# Patient Record
Sex: FEMALE | Race: WHITE | Employment: OTHER | URBAN - METROPOLITAN AREA
[De-identification: names, ages, dates, MRNs, and addresses within clinical notes are randomized per-mention and may not be internally consistent; named-entity substitution may affect disease eponyms.]

---

## 2017-01-30 PROCEDURE — 81003 URINALYSIS AUTO W/O SCOPE: CPT | Performed by: INTERNAL MEDICINE

## 2017-02-01 PROBLEM — D50.8 IRON DEFICIENCY ANEMIA FOLLOWING BARIATRIC SURGERY: Status: ACTIVE | Noted: 2017-02-01

## 2017-02-01 PROBLEM — K95.89 IRON DEFICIENCY ANEMIA FOLLOWING BARIATRIC SURGERY: Status: ACTIVE | Noted: 2017-02-01

## 2017-02-08 PROCEDURE — 82607 VITAMIN B-12: CPT | Performed by: PHYSICIAN ASSISTANT

## 2017-02-08 PROCEDURE — 82746 ASSAY OF FOLIC ACID SERUM: CPT | Performed by: PHYSICIAN ASSISTANT

## 2017-02-24 ENCOUNTER — OFFICE VISIT (OUTPATIENT)
Dept: HEMATOLOGY/ONCOLOGY | Facility: HOSPITAL | Age: 37
End: 2017-02-24
Attending: INTERNAL MEDICINE
Payer: MEDICAID

## 2017-02-24 VITALS
RESPIRATION RATE: 20 BRPM | SYSTOLIC BLOOD PRESSURE: 128 MMHG | DIASTOLIC BLOOD PRESSURE: 81 MMHG | HEART RATE: 79 BPM | TEMPERATURE: 96 F

## 2017-02-24 DIAGNOSIS — D50.8 IRON DEFICIENCY ANEMIA FOLLOWING BARIATRIC SURGERY: Primary | ICD-10-CM

## 2017-02-24 DIAGNOSIS — K95.89 IRON DEFICIENCY ANEMIA FOLLOWING BARIATRIC SURGERY: Primary | ICD-10-CM

## 2017-02-24 PROCEDURE — 96374 THER/PROPH/DIAG INJ IV PUSH: CPT

## 2017-02-24 NOTE — PROGRESS NOTES
Education Record    Learner:  Patient    Disease / Marigene Locks    Barriers / Limitations:  None   Comments:    Method:  Discussion   Comments:    General Topics:  Plan of care reviewed   Comments:    Outcome:  Shows understanding   Comments:

## 2017-03-03 ENCOUNTER — OFFICE VISIT (OUTPATIENT)
Dept: HEMATOLOGY/ONCOLOGY | Facility: HOSPITAL | Age: 37
End: 2017-03-03
Attending: INTERNAL MEDICINE
Payer: MEDICAID

## 2017-03-03 VITALS
HEIGHT: 64.02 IN | WEIGHT: 255.38 LBS | BODY MASS INDEX: 43.6 KG/M2 | SYSTOLIC BLOOD PRESSURE: 116 MMHG | OXYGEN SATURATION: 99 % | HEART RATE: 73 BPM | TEMPERATURE: 99 F | DIASTOLIC BLOOD PRESSURE: 71 MMHG | RESPIRATION RATE: 18 BRPM

## 2017-03-03 DIAGNOSIS — D50.8 IRON DEFICIENCY ANEMIA FOLLOWING BARIATRIC SURGERY: Primary | ICD-10-CM

## 2017-03-03 DIAGNOSIS — K95.89 IRON DEFICIENCY ANEMIA FOLLOWING BARIATRIC SURGERY: Primary | ICD-10-CM

## 2017-03-03 PROCEDURE — 96374 THER/PROPH/DIAG INJ IV PUSH: CPT

## 2017-03-03 RX ORDER — MULTIVITAMIN
1 TABLET ORAL DAILY
COMMUNITY
End: 2019-02-06

## 2017-03-03 NOTE — PROGRESS NOTES
Education Record    Learner:  Patient    Disease / Diagnosis:IRON DEFICIENCY ANEMIA    Barriers / Limitations:  None   Comments:    Method:  Brief focused and Reinforcement   Comments:    General Topics:  Medication, Side effects and symptom management and

## 2017-05-30 PROBLEM — K43.2 INCISIONAL HERNIA, WITHOUT OBSTRUCTION OR GANGRENE: Status: ACTIVE | Noted: 2017-05-30

## 2017-05-30 PROBLEM — D25.1 INTRAMURAL LEIOMYOMA OF UTERUS: Status: ACTIVE | Noted: 2017-05-30

## 2017-05-30 PROCEDURE — 87624 HPV HI-RISK TYP POOLED RSLT: CPT | Performed by: OBSTETRICS & GYNECOLOGY

## 2017-05-30 PROCEDURE — 88175 CYTOPATH C/V AUTO FLUID REDO: CPT | Performed by: OBSTETRICS & GYNECOLOGY

## 2017-07-28 ENCOUNTER — OFFICE VISIT (OUTPATIENT)
Dept: HEMATOLOGY/ONCOLOGY | Age: 37
End: 2017-07-28
Attending: INTERNAL MEDICINE
Payer: MEDICAID

## 2017-07-28 VITALS
DIASTOLIC BLOOD PRESSURE: 77 MMHG | HEART RATE: 65 BPM | TEMPERATURE: 99 F | SYSTOLIC BLOOD PRESSURE: 121 MMHG | RESPIRATION RATE: 18 BRPM

## 2017-07-28 DIAGNOSIS — D50.8 IRON DEFICIENCY ANEMIA FOLLOWING BARIATRIC SURGERY: Primary | ICD-10-CM

## 2017-07-28 DIAGNOSIS — K95.89 IRON DEFICIENCY ANEMIA FOLLOWING BARIATRIC SURGERY: Primary | ICD-10-CM

## 2017-07-28 PROCEDURE — 96374 THER/PROPH/DIAG INJ IV PUSH: CPT

## 2017-07-28 NOTE — PROGRESS NOTES
Education Record  Learner:  Patient  Disease / Diagnosis:   Iron-deficiency anemia  Barriers / Limitations:  None  Method:  Printed material and Reinforcement  General Topics:  Plan of care reviewed  Outcome:  Shows understanding and Patient given copies o

## 2018-01-07 ENCOUNTER — HOSPITAL ENCOUNTER (EMERGENCY)
Age: 38
Discharge: HOME | End: 2018-01-07
Attending: EMERGENCY MEDICINE
Payer: MEDICAID

## 2018-01-07 VITALS
TEMPERATURE: 98 F | SYSTOLIC BLOOD PRESSURE: 148 MMHG | WEIGHT: 236 LBS | DIASTOLIC BLOOD PRESSURE: 86 MMHG | HEART RATE: 85 BPM | HEIGHT: 63 IN | OXYGEN SATURATION: 98 % | RESPIRATION RATE: 16 BRPM | BODY MASS INDEX: 41.82 KG/M2

## 2018-01-07 DIAGNOSIS — S61.411A LACERATION OF RIGHT HAND WITHOUT FOREIGN BODY, INITIAL ENCOUNTER: Primary | ICD-10-CM

## 2018-01-07 PROCEDURE — 12001 RPR S/N/AX/GEN/TRNK 2.5CM/<: CPT

## 2018-01-07 PROCEDURE — 99283 EMERGENCY DEPT VISIT LOW MDM: CPT

## 2018-01-07 NOTE — ED PROVIDER NOTES
Patient Seen in: [facility] Emergency Department In [place]    History   Patient presents with:  Laceration Abrasion (integumentary)    Stated Complaint: hand laceration    HPI    Ambidextrous 26-year-old presents for evaluation of her right hand lacerati HPI.  Constitutional and vital signs reviewed. All other systems reviewed and negative except as noted above.     Physical Exam   ED Triage Vitals [01/07/18 1612]  BP: 153/96  Pulse: 89  Resp: 18  Temp: 97.9 °F (36.6 °C)  Temp src: Temporal  SpO2: 97 % needed        Medications Prescribed:  Current Discharge Medication List

## 2018-01-15 ENCOUNTER — HOSPITAL ENCOUNTER (EMERGENCY)
Age: 38
Discharge: HOME | End: 2018-01-15
Attending: EMERGENCY MEDICINE
Payer: MEDICAID

## 2018-01-15 VITALS
WEIGHT: 238 LBS | DIASTOLIC BLOOD PRESSURE: 80 MMHG | RESPIRATION RATE: 16 BRPM | HEART RATE: 67 BPM | OXYGEN SATURATION: 100 % | HEIGHT: 64 IN | TEMPERATURE: 98 F | BODY MASS INDEX: 40.63 KG/M2 | SYSTOLIC BLOOD PRESSURE: 141 MMHG

## 2018-01-15 DIAGNOSIS — Z48.02 ENCOUNTER FOR REMOVAL OF SUTURES: Primary | ICD-10-CM

## 2018-01-15 NOTE — ED PROVIDER NOTES
I reviewed that chart and discussed the case. I have examined the patient and noted incision clean dry and intact with no surrounding erythema.       I agree with the following clinical impression(s):  Encounter for removal of sutures  (primary encounter d

## 2018-01-15 NOTE — ED PROVIDER NOTES
SUTURE REMOVAL PROCEDURE:  PROCEDURE: Removal of previously placed sutures  LOCATION OF SUTURES: right palmar medial hand   SUTURES PREVIOUSLY PLACED AT: [facility] ER      The wound demonstrates no evidence of infection with adequate tensile strength at the

## 2018-02-02 PROBLEM — N92.6 IRREGULAR PERIODS: Status: ACTIVE | Noted: 2018-02-02

## 2018-02-02 PROCEDURE — 82607 VITAMIN B-12: CPT | Performed by: INTERNAL MEDICINE

## 2018-02-02 PROCEDURE — 82746 ASSAY OF FOLIC ACID SERUM: CPT | Performed by: INTERNAL MEDICINE

## 2018-02-02 PROCEDURE — 81003 URINALYSIS AUTO W/O SCOPE: CPT | Performed by: INTERNAL MEDICINE

## 2018-02-22 ENCOUNTER — TELEPHONE (OUTPATIENT)
Dept: HEMATOLOGY/ONCOLOGY | Facility: HOSPITAL | Age: 38
End: 2018-02-22

## 2018-02-22 PROBLEM — K90.9: Status: ACTIVE | Noted: 2018-02-22

## 2018-02-22 PROBLEM — K90.9 IDIOPATHIC STEATORRHEA: Status: ACTIVE | Noted: 2018-02-22

## 2018-02-28 ENCOUNTER — OFFICE VISIT (OUTPATIENT)
Dept: HEMATOLOGY/ONCOLOGY | Age: 38
End: 2018-02-28
Attending: INTERNAL MEDICINE
Payer: MEDICAID

## 2018-02-28 VITALS
SYSTOLIC BLOOD PRESSURE: 116 MMHG | HEART RATE: 71 BPM | OXYGEN SATURATION: 99 % | TEMPERATURE: 99 F | DIASTOLIC BLOOD PRESSURE: 77 MMHG | RESPIRATION RATE: 16 BRPM

## 2018-02-28 DIAGNOSIS — K95.89 IRON DEFICIENCY ANEMIA FOLLOWING BARIATRIC SURGERY: ICD-10-CM

## 2018-02-28 DIAGNOSIS — K90.9 IDIOPATHIC STEATORRHEA: Primary | ICD-10-CM

## 2018-02-28 DIAGNOSIS — D50.8 IRON DEFICIENCY ANEMIA FOLLOWING BARIATRIC SURGERY: ICD-10-CM

## 2018-02-28 PROCEDURE — 96374 THER/PROPH/DIAG INJ IV PUSH: CPT

## 2018-02-28 RX ORDER — DIPHENHYDRAMINE HYDROCHLORIDE 50 MG/ML
25 INJECTION INTRAMUSCULAR; INTRAVENOUS ONCE AS NEEDED
Status: CANCELLED | OUTPATIENT
Start: 2018-02-28 | End: 2018-02-28

## 2018-02-28 NOTE — PROGRESS NOTES
Education Record    Learner:  Patient    Disease / Peck Gavel deficiency anemia    Barriers / Limitations:  None   Comments:    Method:  Brief focused and Printed material   Comments:    General Topics:  Medication, Side effects and symptom management

## 2018-03-07 ENCOUNTER — APPOINTMENT (OUTPATIENT)
Dept: HEMATOLOGY/ONCOLOGY | Age: 38
End: 2018-03-07
Attending: INTERNAL MEDICINE
Payer: MEDICAID

## 2018-03-14 ENCOUNTER — OFFICE VISIT (OUTPATIENT)
Dept: HEMATOLOGY/ONCOLOGY | Age: 38
End: 2018-03-14
Attending: INTERNAL MEDICINE
Payer: MEDICAID

## 2018-03-14 VITALS
SYSTOLIC BLOOD PRESSURE: 100 MMHG | HEART RATE: 65 BPM | TEMPERATURE: 99 F | DIASTOLIC BLOOD PRESSURE: 57 MMHG | RESPIRATION RATE: 18 BRPM

## 2018-03-14 DIAGNOSIS — K95.89 IRON DEFICIENCY ANEMIA FOLLOWING BARIATRIC SURGERY: ICD-10-CM

## 2018-03-14 DIAGNOSIS — K90.9 IDIOPATHIC STEATORRHEA: Primary | ICD-10-CM

## 2018-03-14 DIAGNOSIS — D50.8 IRON DEFICIENCY ANEMIA FOLLOWING BARIATRIC SURGERY: ICD-10-CM

## 2018-03-14 PROCEDURE — 96374 THER/PROPH/DIAG INJ IV PUSH: CPT

## 2018-03-14 RX ORDER — DIPHENHYDRAMINE HYDROCHLORIDE 50 MG/ML
25 INJECTION INTRAMUSCULAR; INTRAVENOUS ONCE AS NEEDED
Status: CANCELLED | OUTPATIENT
Start: 2018-03-14 | End: 2018-03-14

## 2018-07-05 ENCOUNTER — HOSPITAL ENCOUNTER (EMERGENCY)
Age: 38
Discharge: HOME | End: 2018-07-05
Attending: EMERGENCY MEDICINE
Payer: MEDICAID

## 2018-07-05 ENCOUNTER — APPOINTMENT (OUTPATIENT)
Dept: GENERAL RADIOLOGY | Age: 38
End: 2018-07-05
Attending: EMERGENCY MEDICINE
Payer: MEDICAID

## 2018-07-05 VITALS
OXYGEN SATURATION: 98 % | RESPIRATION RATE: 16 BRPM | TEMPERATURE: 98 F | WEIGHT: 240 LBS | HEIGHT: 64 IN | SYSTOLIC BLOOD PRESSURE: 143 MMHG | BODY MASS INDEX: 40.97 KG/M2 | DIASTOLIC BLOOD PRESSURE: 84 MMHG | HEART RATE: 78 BPM

## 2018-07-05 DIAGNOSIS — R07.89 CHEST WALL PAIN: Primary | ICD-10-CM

## 2018-07-05 PROCEDURE — 99283 EMERGENCY DEPT VISIT LOW MDM: CPT

## 2018-07-05 PROCEDURE — 71101 X-RAY EXAM UNILAT RIBS/CHEST: CPT | Performed by: EMERGENCY MEDICINE

## 2018-07-06 NOTE — ED INITIAL ASSESSMENT (HPI)
Patient presents with left rib pain, patient states 3 days ago she was getting her back cracked and felt pain and heard a \"snap\".

## 2018-07-06 NOTE — ED PROVIDER NOTES
Patient Seen in: [facility] Emergency Department In [place]    History   Patient presents with:  Abdomen/Flank Pain (GI/)    Stated Complaint: L rib pain after having back cracked 4 days ago    MAKENNA    Foreign Grider is a 42-year-old female coming with complaints Types: Cigarettes  Smokeless tobacco: Never Used                      Alcohol use: Yes           0.0 oz/week     Comment: special occasions only      Review of Systems    Positive for stated complaint: L rib pain after having back cracked 4 days ago  Other

## 2018-08-07 PROCEDURE — 88305 TISSUE EXAM BY PATHOLOGIST: CPT | Performed by: OBSTETRICS & GYNECOLOGY

## 2018-12-07 ENCOUNTER — HOSPITAL ENCOUNTER (EMERGENCY)
Age: 38
Discharge: HOME | End: 2018-12-07
Attending: EMERGENCY MEDICINE
Payer: MEDICAID

## 2018-12-07 VITALS
DIASTOLIC BLOOD PRESSURE: 75 MMHG | WEIGHT: 230 LBS | SYSTOLIC BLOOD PRESSURE: 117 MMHG | RESPIRATION RATE: 18 BRPM | BODY MASS INDEX: 39 KG/M2 | OXYGEN SATURATION: 97 % | HEART RATE: 72 BPM | TEMPERATURE: 98 F

## 2018-12-07 DIAGNOSIS — L03.114 CELLULITIS OF LEFT UPPER EXTREMITY: Primary | ICD-10-CM

## 2018-12-07 PROCEDURE — 99283 EMERGENCY DEPT VISIT LOW MDM: CPT

## 2018-12-07 RX ORDER — CEPHALEXIN 500 MG/1
500 CAPSULE ORAL 4 TIMES DAILY
Qty: 40 CAPSULE | Refills: 0 | Status: SHIPPED | OUTPATIENT
Start: 2018-12-07 | End: 2018-12-17

## 2018-12-08 ENCOUNTER — HOSPITAL ENCOUNTER (EMERGENCY)
Age: 38
Discharge: HOME | End: 2018-12-08
Attending: EMERGENCY MEDICINE
Payer: MEDICAID

## 2018-12-08 VITALS
WEIGHT: 229.94 LBS | TEMPERATURE: 98 F | BODY MASS INDEX: 39 KG/M2 | RESPIRATION RATE: 12 BRPM | SYSTOLIC BLOOD PRESSURE: 132 MMHG | OXYGEN SATURATION: 100 % | HEART RATE: 77 BPM | DIASTOLIC BLOOD PRESSURE: 78 MMHG

## 2018-12-08 DIAGNOSIS — L03.012 PARONYCHIA OF FINGER, LEFT: Primary | ICD-10-CM

## 2018-12-08 PROCEDURE — 99283 EMERGENCY DEPT VISIT LOW MDM: CPT

## 2018-12-08 PROCEDURE — 10060 I&D ABSCESS SIMPLE/SINGLE: CPT

## 2018-12-08 PROCEDURE — 99282 EMERGENCY DEPT VISIT SF MDM: CPT

## 2018-12-08 RX ORDER — CLINDAMYCIN HYDROCHLORIDE 300 MG/1
300 CAPSULE ORAL 3 TIMES DAILY
Qty: 15 CAPSULE | Refills: 0 | Status: SHIPPED | OUTPATIENT
Start: 2018-12-08 | End: 2018-12-13

## 2018-12-08 NOTE — ED PROVIDER NOTES
Patient Seen in: THE [facility] [place] Emergency Department In [place]    History   Patient presents with:  Upper Extremity Injury (musculoskeletal)    Stated Complaint: paronychia? left thumb    HPI    Patient is a 50-year-old female comes in emergency room for satya noted in HPI. Constitutional and vital signs reviewed. All other systems reviewed and negative except as noted above.     Physical Exam     ED Triage Vitals [12/07/18 2042]   /75   Pulse 72   Resp 18   Temp 97.7 °F (36.5 °C)   Temp src    SpO2 9 changing or persisting symptoms. I discussed the case with the patient and they had no questions, complaints, or concerns. Patient felt comfortable going home.                   Disposition and Plan     Clinical Impression:  Cellulitis of left upper extre

## 2018-12-09 NOTE — ED PROVIDER NOTES
Patient Seen in: [facility] In [place]    History   Patient presents with:  Upper Extremity Injury (musculoskeletal)    Stated Complaint: Paronychia to left thumb is getting worse, per pt.  She was seen here last night*    HPI    39-ye getting worse, per pt. She was seen here last night*  Other systems are as noted in HPI. Constitutional and vital signs reviewed. All other systems reviewed and negative except as noted above.     Physical Exam     ED Triage Vitals [12/08/18 2130]   B List    START taking these medications    Clindamycin HCl 300 MG Oral Cap  Take 1 capsule (300 mg total) by mouth 3 (three) times daily for 5 days.   Qty: 15 capsule Refills: 0

## 2019-01-09 ENCOUNTER — HOSPITAL ENCOUNTER (EMERGENCY)
Age: 39
Discharge: HOME | End: 2019-01-09
Payer: MEDICAID

## 2019-01-09 VITALS
BODY MASS INDEX: 39.27 KG/M2 | RESPIRATION RATE: 20 BRPM | WEIGHT: 230 LBS | OXYGEN SATURATION: 100 % | TEMPERATURE: 99 F | HEIGHT: 64 IN | SYSTOLIC BLOOD PRESSURE: 135 MMHG | DIASTOLIC BLOOD PRESSURE: 69 MMHG | HEART RATE: 59 BPM

## 2019-01-09 DIAGNOSIS — S61.209A AVULSION OF FINGER, INITIAL ENCOUNTER: Primary | ICD-10-CM

## 2019-01-09 PROCEDURE — 99283 EMERGENCY DEPT VISIT LOW MDM: CPT

## 2019-01-10 NOTE — ED PROVIDER NOTES
Patient Seen in: THE [facility] Emergency Department In [place]    History   Patient presents with:  Laceration Abrasion (integumentary)    Stated Complaint: left thumb laceration    78-year-old female who presents to the emergency room for a laceration to th • UPPER GI ENDOSCOPY,DIAGNOSIS  7/26/2010    s/p gastric bypass surgery           Social History    Tobacco Use      Smoking status: Former Smoker        Packs/day: 0.50        Years: 14.00        Pack years: 7        Types: Cigarettes      Smokeless tobac Wound was copiously irrigated. Gelfoam applied with a large tube gauze. Patient tolerated procedure well.            MDM   Discharge note            Disposition and Plan     Clinical Impression:  Avulsion of finger, initial encounter  (primary encounter d

## 2019-02-06 PROCEDURE — 81003 URINALYSIS AUTO W/O SCOPE: CPT | Performed by: INTERNAL MEDICINE

## 2019-10-19 PROBLEM — I83.819 VARICOSE VEINS WITH PAIN: Status: ACTIVE | Noted: 2019-10-19

## 2020-10-19 ENCOUNTER — APPOINTMENT (OUTPATIENT)
Dept: LAB | Age: 40
End: 2020-10-19
Attending: INTERNAL MEDICINE
Payer: MEDICAID

## 2020-10-19 DIAGNOSIS — K62.5 RECTAL BLEED: ICD-10-CM

## 2020-10-21 ENCOUNTER — ANESTHESIA (OUTPATIENT)
Dept: ENDOSCOPY | Facility: HOSPITAL | Age: 40
End: 2020-10-21
Payer: MEDICAID

## 2020-10-21 ENCOUNTER — ANESTHESIA EVENT (OUTPATIENT)
Dept: ENDOSCOPY | Facility: HOSPITAL | Age: 40
End: 2020-10-21
Payer: MEDICAID

## 2020-10-21 ENCOUNTER — HOSPITAL ENCOUNTER (OUTPATIENT)
Facility: HOSPITAL | Age: 40
Setting detail: HOSPITAL OUTPATIENT SURGERY
Discharge: HOME | End: 2020-10-21
Attending: INTERNAL MEDICINE | Admitting: INTERNAL MEDICINE
Payer: MEDICAID

## 2020-10-21 VITALS
DIASTOLIC BLOOD PRESSURE: 75 MMHG | WEIGHT: 219 LBS | SYSTOLIC BLOOD PRESSURE: 126 MMHG | HEIGHT: 64 IN | BODY MASS INDEX: 37.39 KG/M2 | HEART RATE: 54 BPM | TEMPERATURE: 99 F | RESPIRATION RATE: 20 BRPM | OXYGEN SATURATION: 100 %

## 2020-10-21 DIAGNOSIS — K62.5 RECTAL BLEEDING: ICD-10-CM

## 2020-10-21 DIAGNOSIS — K62.5 RECTAL BLEED: Primary | ICD-10-CM

## 2020-10-21 PROCEDURE — 0DJD8ZZ INSPECTION OF LOWER INTESTINAL TRACT, VIA NATURAL OR ARTIFICIAL OPENING ENDOSCOPIC: ICD-10-PCS | Performed by: INTERNAL MEDICINE

## 2020-10-21 RX ORDER — MIDAZOLAM HYDROCHLORIDE 1 MG/ML
INJECTION INTRAMUSCULAR; INTRAVENOUS AS NEEDED
Status: DISCONTINUED | OUTPATIENT
Start: 2020-10-21 | End: 2020-10-21 | Stop reason: SURG

## 2020-10-21 RX ORDER — SODIUM CHLORIDE, SODIUM LACTATE, POTASSIUM CHLORIDE, CALCIUM CHLORIDE 600; 310; 30; 20 MG/100ML; MG/100ML; MG/100ML; MG/100ML
INJECTION, SOLUTION INTRAVENOUS CONTINUOUS
Status: DISCONTINUED | OUTPATIENT
Start: 2020-10-21 | End: 2020-10-21

## 2020-10-21 RX ORDER — NALOXONE HYDROCHLORIDE 0.4 MG/ML
80 INJECTION, SOLUTION INTRAMUSCULAR; INTRAVENOUS; SUBCUTANEOUS AS NEEDED
Status: DISCONTINUED | OUTPATIENT
Start: 2020-10-21 | End: 2020-10-21

## 2020-10-21 RX ORDER — LIDOCAINE HYDROCHLORIDE 10 MG/ML
INJECTION, SOLUTION EPIDURAL; INFILTRATION; INTRACAUDAL; PERINEURAL AS NEEDED
Status: DISCONTINUED | OUTPATIENT
Start: 2020-10-21 | End: 2020-10-21 | Stop reason: SURG

## 2020-10-21 RX ORDER — ONDANSETRON 2 MG/ML
4 INJECTION INTRAMUSCULAR; INTRAVENOUS AS NEEDED
Status: DISCONTINUED | OUTPATIENT
Start: 2020-10-21 | End: 2020-10-21

## 2020-10-21 RX ADMIN — LIDOCAINE HYDROCHLORIDE 30 MG: 10 INJECTION, SOLUTION EPIDURAL; INFILTRATION; INTRACAUDAL; PERINEURAL at 09:50:00

## 2020-10-21 RX ADMIN — SODIUM CHLORIDE, SODIUM LACTATE, POTASSIUM CHLORIDE, CALCIUM CHLORIDE: 600; 310; 30; 20 INJECTION, SOLUTION INTRAVENOUS at 09:43:00

## 2020-10-21 RX ADMIN — SODIUM CHLORIDE, SODIUM LACTATE, POTASSIUM CHLORIDE, CALCIUM CHLORIDE: 600; 310; 30; 20 INJECTION, SOLUTION INTRAVENOUS at 10:04:00

## 2020-10-21 RX ADMIN — MIDAZOLAM HYDROCHLORIDE 2 MG: 1 INJECTION INTRAMUSCULAR; INTRAVENOUS at 09:50:00

## 2020-10-21 NOTE — OPERATIVE REPORT
OPERATIVE REPORT   PATIENT NAME: Manuela Severance  MRN: [id number]  DATE OF OPERATION: 10/21/2020  PREOPERATIVE DIAGNOSIS: rectal bleeding   POSTOPERATIVE DIAGNOSES   1.  Small internal hemorrhoids  PROCEDURE PERFORMED: Colonoscopy to cecum  SCOPE UTILIZED: semisolid stool could be suctioned but > 90% of mucosa seen  POOR  - semisolid stool could not be suctioned and < 90% of mucosal seen  INADEQUATE- repeat preparation needed      Thank you very much for the consultation. I really appreciate it.     Ashlee Winkler

## 2020-10-21 NOTE — H&P
History & Physical Examination    Patient Name: Guanakito Dumont  MRN: [id number]  [facility]: [id number]  YOB: 1980    Diagnosis: Rectal bleeding [K62.5]      Present Illness:  Guanakito Dumont is a 35year old female is here Rectal bleeding [K62. 110 [place] neck   • UPPER GI ENDOSCOPY,DIAGNOSIS  3/5/2003    hiatal hernia   • UPPER GI ENDOSCOPY,DIAGNOSIS  2009    at [address] [place] [address]--normal per patient   • UPPER GI ENDOSCOPY,DIAGNOSIS  7/26/2010    s/p gastric bypass surgery     Family History   Problem Rela

## 2020-10-21 NOTE — ANESTHESIA POSTPROCEDURE EVALUATION
[facility] Patient Status:  Hospital Outpatient Surgery   Age/Gender 35year old female MRN [id number]   Location [address] [place] [address]. Attending Aileen Trevino MD   Hosp Day # 0 PCP Sadie Livingston MD       Anesthesia Post-

## 2020-10-21 NOTE — ANESTHESIA PREPROCEDURE EVALUATION
PRE-OP EVALUATION    Patient Name: Delilah Chauhan    Pre-op Diagnosis: Rectal bleeding [K62.5]    Procedure(s):  COLONOSCOPY    Surgeon(s) and Role:     * Crescencio Caldera MD - Primary    Pre-op vitals reviewed. Body mass index is 37.76 kg/m². without obstruction or gangrene     Irregular periods     Idiopathic steatorrhea     Varicose veins with pain          Past Surgical History:   Procedure Laterality Date   • BOWEL RESECTION  12-08    at [facility] in [place], for sbo due to adhesions   • C-SEC patient                Present on Admission:  **None**

## 2020-11-09 ENCOUNTER — HOSPITAL ENCOUNTER (EMERGENCY)
Age: 40
Discharge: HOME | End: 2020-11-09
Attending: EMERGENCY MEDICINE
Payer: MEDICAID

## 2020-11-09 VITALS
RESPIRATION RATE: 72 BRPM | OXYGEN SATURATION: 98 % | SYSTOLIC BLOOD PRESSURE: 140 MMHG | WEIGHT: 220 LBS | DIASTOLIC BLOOD PRESSURE: 72 MMHG | BODY MASS INDEX: 37.56 KG/M2 | HEIGHT: 64 IN | TEMPERATURE: 100 F | HEART RATE: 72 BPM

## 2020-11-09 DIAGNOSIS — B34.9 VIRAL SYNDROME: Primary | ICD-10-CM

## 2020-11-09 PROCEDURE — 99283 EMERGENCY DEPT VISIT LOW MDM: CPT | Performed by: EMERGENCY MEDICINE

## 2020-11-09 RX ORDER — FLUTICASONE PROPIONATE 50 MCG
2 SPRAY, SUSPENSION (ML) NASAL DAILY
Qty: 16 G | Refills: 0 | Status: SHIPPED | OUTPATIENT
Start: 2020-11-09 | End: 2020-12-09

## 2020-11-09 NOTE — ED PROVIDER NOTES
Patient Seen in: [facility] Emergency Department In [place]      History   Patient presents with:  Testing    Stated Complaint: bodyaches, congestion, fatigue    HPI    Patient is a 59-year-old female comes emergency room for evaluation of multiple compla patient   • UPPER GI ENDOSCOPY,DIAGNOSIS  7/26/2010    s/p gastric bypass surgery                    Social History    Tobacco Use      Smoking status: Former Smoker        Packs/day: 0.50        Years: 14.00        Pack years: 7        Types: Cigarettes symptoms. Patient had Covid test performed. Vitals appeared good. No indication for inpatient treatment. Recommend self-isolation. Patient advised that she is unable to go to work at this time. At least 10-day isolation of positive.   14-day isolation

## 2020-11-09 NOTE — ED INITIAL ASSESSMENT (HPI)
Pt was in contact w a friend who is positive w covid. Pt is having fatigue, weakness and cough w congestion.

## 2021-02-11 PROBLEM — J30.2 SEASONAL ALLERGIES: Status: ACTIVE | Noted: 2021-02-11

## 2021-02-11 PROBLEM — L20.82 FLEXURAL ECZEMA: Status: ACTIVE | Noted: 2021-02-11

## 2021-02-11 PROBLEM — K90.9: Status: RESOLVED | Noted: 2018-02-22 | Resolved: 2021-02-11

## 2021-02-11 PROBLEM — N92.6 IRREGULAR PERIODS: Status: RESOLVED | Noted: 2018-02-02 | Resolved: 2021-02-11

## 2021-02-11 PROBLEM — K90.9 IDIOPATHIC STEATORRHEA: Status: RESOLVED | Noted: 2018-02-22 | Resolved: 2021-02-11

## 2021-02-11 PROBLEM — I83.819 VARICOSE VEINS WITH PAIN: Status: RESOLVED | Noted: 2019-10-19 | Resolved: 2021-02-11

## 2022-01-12 ENCOUNTER — LAB ENCOUNTER (OUTPATIENT)
Dept: LAB | Facility: HOSPITAL | Age: 42
End: 2022-01-12
Attending: OBSTETRICS & GYNECOLOGY
Payer: MEDICAID

## 2022-01-12 DIAGNOSIS — K21.00 GASTROESOPHAGEAL REFLUX DISEASE WITH ESOPHAGITIS WITHOUT HEMORRHAGE: ICD-10-CM

## 2022-01-12 DIAGNOSIS — K95.89 IRON DEFICIENCY ANEMIA FOLLOWING BARIATRIC SURGERY: ICD-10-CM

## 2022-01-12 DIAGNOSIS — D50.8 IRON DEFICIENCY ANEMIA FOLLOWING BARIATRIC SURGERY: ICD-10-CM

## 2022-01-12 DIAGNOSIS — Z11.3 ROUTINE SCREENING FOR STI (SEXUALLY TRANSMITTED INFECTION): ICD-10-CM

## 2022-01-12 DIAGNOSIS — Z11.4 SCREENING FOR HIV (HUMAN IMMUNODEFICIENCY VIRUS): ICD-10-CM

## 2022-01-12 DIAGNOSIS — R63.5 WEIGHT GAIN: ICD-10-CM

## 2022-01-12 DIAGNOSIS — J30.2 SEASONAL ALLERGIES: ICD-10-CM

## 2022-01-12 DIAGNOSIS — Z13.220 SCREENING CHOLESTEROL LEVEL: ICD-10-CM

## 2022-01-12 DIAGNOSIS — Z00.00 ANNUAL PHYSICAL EXAM: ICD-10-CM

## 2022-01-12 LAB
ANION GAP SERPL CALC-SCNC: 4 MMOL/L (ref 0–18)
BILIRUB UR QL STRIP.AUTO: NEGATIVE
BUN BLD-MCNC: 16 MG/DL (ref 7–18)
CALCIUM BLD-MCNC: 9 MG/DL (ref 8.5–10.1)
CHLORIDE SERPL-SCNC: 104 MMOL/L (ref 98–112)
CHOLEST SERPL-MCNC: 161 MG/DL (ref ?–200)
CLARITY UR REFRACT.AUTO: CLEAR
CO2 SERPL-SCNC: 29 MMOL/L (ref 21–32)
COLOR UR AUTO: YELLOW
CREAT BLD-MCNC: 0.68 MG/DL
DEPRECATED HBV CORE AB SER IA-ACNC: 4.5 NG/ML
ERYTHROCYTE [DISTWIDTH] IN BLOOD BY AUTOMATED COUNT: 14.6 %
FASTING PATIENT LIPID ANSWER: YES
FASTING STATUS PATIENT QL REPORTED: YES
GLUCOSE BLD-MCNC: 83 MG/DL (ref 70–99)
GLUCOSE UR STRIP.AUTO-MCNC: NEGATIVE MG/DL
HBV SURFACE AB SER QL: NONREACTIVE
HBV SURFACE AB SERPL IA-ACNC: 3.55 MIU/ML
HCT VFR BLD AUTO: 34.4 %
HDLC SERPL-MCNC: 74 MG/DL (ref 40–59)
HGB BLD-MCNC: 10.2 G/DL
IRON SATN MFR SERPL: 3 %
IRON SERPL-MCNC: 16 UG/DL
KETONES UR STRIP.AUTO-MCNC: NEGATIVE MG/DL
LDLC SERPL CALC-MCNC: 76 MG/DL (ref ?–100)
LEUKOCYTE ESTERASE UR QL STRIP.AUTO: NEGATIVE
MCH RBC QN AUTO: 23.4 PG (ref 26–34)
MCHC RBC AUTO-ENTMCNC: 29.7 G/DL (ref 31–37)
MCV RBC AUTO: 78.9 FL
NITRITE UR QL STRIP.AUTO: NEGATIVE
NONHDLC SERPL-MCNC: 87 MG/DL (ref ?–130)
OSMOLALITY SERPL CALC.SUM OF ELEC: 284 MOSM/KG (ref 275–295)
PH UR STRIP.AUTO: 6 [PH] (ref 5–8)
PLATELET # BLD AUTO: 379 10(3)UL (ref 150–450)
POTASSIUM SERPL-SCNC: 4.3 MMOL/L (ref 3.5–5.1)
PROT UR STRIP.AUTO-MCNC: NEGATIVE MG/DL
RBC # BLD AUTO: 4.36 X10(6)UL
RBC UR QL AUTO: NEGATIVE
SODIUM SERPL-SCNC: 137 MMOL/L (ref 136–145)
SP GR UR STRIP.AUTO: 1.02 (ref 1–1.03)
T PALLIDUM AB SER QL IA: NONREACTIVE
TIBC SERPL-MCNC: 508 UG/DL (ref 240–450)
TRANSFERRIN SERPL-MCNC: 341 MG/DL (ref 200–360)
TRIGL SERPL-MCNC: 54 MG/DL (ref 30–149)
TSI SER-ACNC: 1.57 MIU/ML (ref 0.36–3.74)
UROBILINOGEN UR STRIP.AUTO-MCNC: <2 MG/DL
VLDLC SERPL CALC-MCNC: 8 MG/DL (ref 0–30)
WBC # BLD AUTO: 7.2 X10(3) UL (ref 4–11)

## 2022-01-12 PROCEDURE — 86780 TREPONEMA PALLIDUM: CPT

## 2022-01-12 PROCEDURE — 80048 BASIC METABOLIC PNL TOTAL CA: CPT

## 2022-01-12 PROCEDURE — 83550 IRON BINDING TEST: CPT

## 2022-01-12 PROCEDURE — 84443 ASSAY THYROID STIM HORMONE: CPT

## 2022-01-12 PROCEDURE — 86706 HEP B SURFACE ANTIBODY: CPT

## 2022-01-12 PROCEDURE — 82728 ASSAY OF FERRITIN: CPT

## 2022-01-12 PROCEDURE — 81003 URINALYSIS AUTO W/O SCOPE: CPT

## 2022-01-12 PROCEDURE — 83540 ASSAY OF IRON: CPT

## 2022-01-12 PROCEDURE — 36415 COLL VENOUS BLD VENIPUNCTURE: CPT

## 2022-01-12 PROCEDURE — 85027 COMPLETE CBC AUTOMATED: CPT

## 2022-01-12 PROCEDURE — 80061 LIPID PANEL: CPT

## 2022-01-12 PROCEDURE — 87389 HIV-1 AG W/HIV-1&-2 AB AG IA: CPT

## 2022-01-14 NOTE — PROGRESS NOTES
Called patient at [phone] and spoke to them. Gave PCP comment/recommendations. Patient verbalized understanding.   Patient states she usually gets Iron, hasn't received it in 1 year  Referral placed  Patient requesting MyChart message  with referral

## 2023-03-01 ENCOUNTER — HOSPITAL ENCOUNTER (EMERGENCY)
Age: 43
Discharge: HOME | End: 2023-03-01
Attending: EMERGENCY MEDICINE
Payer: MEDICAID

## 2023-03-01 VITALS
TEMPERATURE: 98 F | RESPIRATION RATE: 18 BRPM | SYSTOLIC BLOOD PRESSURE: 120 MMHG | HEIGHT: 64 IN | WEIGHT: 200 LBS | OXYGEN SATURATION: 100 % | BODY MASS INDEX: 34.15 KG/M2 | HEART RATE: 69 BPM | DIASTOLIC BLOOD PRESSURE: 77 MMHG

## 2023-03-01 DIAGNOSIS — L50.9 URTICARIA: Primary | ICD-10-CM

## 2023-03-01 PROCEDURE — S0028 INJECTION, FAMOTIDINE, 20 MG: HCPCS | Performed by: EMERGENCY MEDICINE

## 2023-03-01 PROCEDURE — 96374 THER/PROPH/DIAG INJ IV PUSH: CPT

## 2023-03-01 PROCEDURE — 99284 EMERGENCY DEPT VISIT MOD MDM: CPT

## 2023-03-01 PROCEDURE — 96375 TX/PRO/DX INJ NEW DRUG ADDON: CPT

## 2023-03-01 RX ORDER — PREDNISONE 20 MG/1
40 TABLET ORAL DAILY
Qty: 10 TABLET | Refills: 0 | Status: SHIPPED | OUTPATIENT
Start: 2023-03-01 | End: 2023-03-06

## 2023-03-01 RX ORDER — FAMOTIDINE 20 MG/1
20 TABLET, FILM COATED ORAL 2 TIMES DAILY
Qty: 30 TABLET | Refills: 0 | Status: SHIPPED | OUTPATIENT
Start: 2023-03-01 | End: 2023-03-31

## 2023-03-01 RX ORDER — CETIRIZINE HYDROCHLORIDE 10 MG/1
10 TABLET ORAL DAILY
Qty: 30 TABLET | Refills: 0 | Status: SHIPPED | OUTPATIENT
Start: 2023-03-01 | End: 2023-03-31

## 2023-03-01 RX ORDER — DIPHENHYDRAMINE HYDROCHLORIDE 50 MG/ML
25 INJECTION INTRAMUSCULAR; INTRAVENOUS ONCE
Status: COMPLETED | OUTPATIENT
Start: 2023-03-01 | End: 2023-03-01

## 2023-03-01 RX ORDER — DEXAMETHASONE SODIUM PHOSPHATE 10 MG/ML
10 INJECTION, SOLUTION INTRAMUSCULAR; INTRAVENOUS ONCE
Status: COMPLETED | OUTPATIENT
Start: 2023-03-01 | End: 2023-03-01

## 2023-03-01 RX ORDER — FAMOTIDINE 10 MG/ML
20 INJECTION, SOLUTION INTRAVENOUS ONCE
Status: COMPLETED | OUTPATIENT
Start: 2023-03-01 | End: 2023-03-01

## 2023-05-17 ENCOUNTER — HOSPITAL ENCOUNTER (EMERGENCY)
Age: 43
Discharge: HOME | End: 2023-05-17
Attending: EMERGENCY MEDICINE
Payer: MEDICAID

## 2023-05-17 VITALS
HEIGHT: 64 IN | WEIGHT: 194 LBS | OXYGEN SATURATION: 98 % | DIASTOLIC BLOOD PRESSURE: 82 MMHG | TEMPERATURE: 98 F | SYSTOLIC BLOOD PRESSURE: 135 MMHG | RESPIRATION RATE: 16 BRPM | BODY MASS INDEX: 33.12 KG/M2 | HEART RATE: 66 BPM

## 2023-05-17 DIAGNOSIS — Z20.2 EXPOSURE TO SEXUALLY TRANSMITTED DISEASE (STD): Primary | ICD-10-CM

## 2023-05-17 LAB — B-HCG UR QL: NEGATIVE

## 2023-05-17 PROCEDURE — 81025 URINE PREGNANCY TEST: CPT

## 2023-05-17 PROCEDURE — 87480 CANDIDA DNA DIR PROBE: CPT | Performed by: EMERGENCY MEDICINE

## 2023-05-17 PROCEDURE — 87591 N.GONORRHOEAE DNA AMP PROB: CPT | Performed by: EMERGENCY MEDICINE

## 2023-05-17 PROCEDURE — 99284 EMERGENCY DEPT VISIT MOD MDM: CPT

## 2023-05-17 PROCEDURE — 87491 CHLMYD TRACH DNA AMP PROBE: CPT | Performed by: EMERGENCY MEDICINE

## 2023-05-17 PROCEDURE — 87510 GARDNER VAG DNA DIR PROBE: CPT | Performed by: EMERGENCY MEDICINE

## 2023-05-17 PROCEDURE — 87660 TRICHOMONAS VAGIN DIR PROBE: CPT | Performed by: EMERGENCY MEDICINE

## 2023-05-17 RX ORDER — TRAZODONE HYDROCHLORIDE 50 MG/1
50 TABLET ORAL NIGHTLY
Qty: 30 TABLET | Refills: 6 | COMMUNITY
Start: 2023-02-01 | End: 2023-08-30

## 2023-05-18 LAB
C TRACH DNA SPEC QL NAA+PROBE: NEGATIVE
N GONORRHOEA DNA SPEC QL NAA+PROBE: NEGATIVE

## 2023-05-18 RX ORDER — METRONIDAZOLE 500 MG/1
500 TABLET ORAL 3 TIMES DAILY
Qty: 30 TABLET | Refills: 0 | Status: SHIPPED | OUTPATIENT
Start: 2023-05-18 | End: 2023-05-28

## 2023-05-18 NOTE — ED NOTES
Spoke with this patient regarding her test results and need to take abx as prescribed.  Pt verbalized an understanding

## 2024-02-14 ENCOUNTER — HOSPITAL ENCOUNTER (OUTPATIENT)
Dept: MAMMOGRAPHY | Age: 44
Discharge: HOME | End: 2024-02-14
Attending: OBSTETRICS & GYNECOLOGY
Payer: MEDICAID

## 2024-02-14 DIAGNOSIS — Z12.31 ENCOUNTER FOR MAMMOGRAM TO ESTABLISH BASELINE MAMMOGRAM: ICD-10-CM

## 2024-02-14 PROCEDURE — 77067 SCR MAMMO BI INCL CAD: CPT | Performed by: OBSTETRICS & GYNECOLOGY

## 2024-02-14 PROCEDURE — 77063 BREAST TOMOSYNTHESIS BI: CPT | Performed by: OBSTETRICS & GYNECOLOGY

## 2024-06-14 ENCOUNTER — APPOINTMENT (OUTPATIENT)
Dept: ADMINISTRATIVE | Facility: HOSPITAL | Age: 44
End: 2024-06-14
Payer: MEDICAID

## 2024-06-14 RX ORDER — TRAZODONE HYDROCHLORIDE 50 MG/1
50 TABLET ORAL NIGHTLY PRN
COMMUNITY

## 2024-06-22 ENCOUNTER — LABORATORY ENCOUNTER (OUTPATIENT)
Dept: LAB | Age: 44
End: 2024-06-22
Attending: OBSTETRICS & GYNECOLOGY

## 2024-06-22 DIAGNOSIS — Z01.818 PRE-OP TESTING: ICD-10-CM

## 2024-06-22 LAB
ERYTHROCYTE [DISTWIDTH] IN BLOOD BY AUTOMATED COUNT: 12.6 %
HCT VFR BLD AUTO: 36.9 %
HGB BLD-MCNC: 11.7 G/DL
MCH RBC QN AUTO: 29.5 PG (ref 26–34)
MCHC RBC AUTO-ENTMCNC: 31.7 G/DL (ref 31–37)
MCV RBC AUTO: 92.9 FL
PLATELET # BLD AUTO: 248 10(3)UL (ref 150–450)
RBC # BLD AUTO: 3.97 X10(6)UL
WBC # BLD AUTO: 4.9 X10(3) UL (ref 4–11)

## 2024-06-22 PROCEDURE — 36415 COLL VENOUS BLD VENIPUNCTURE: CPT

## 2024-06-22 PROCEDURE — 85027 COMPLETE CBC AUTOMATED: CPT

## 2024-07-02 ENCOUNTER — ANESTHESIA (OUTPATIENT)
Dept: SURGERY | Facility: HOSPITAL | Age: 44
End: 2024-07-02
Payer: MEDICAID

## 2024-07-02 ENCOUNTER — HOSPITAL ENCOUNTER (OUTPATIENT)
Facility: HOSPITAL | Age: 44
Discharge: HOME | End: 2024-07-05
Attending: OBSTETRICS & GYNECOLOGY | Admitting: OBSTETRICS & GYNECOLOGY
Payer: MEDICAID

## 2024-07-02 ENCOUNTER — ANESTHESIA EVENT (OUTPATIENT)
Dept: SURGERY | Facility: HOSPITAL | Age: 44
End: 2024-07-02
Payer: MEDICAID

## 2024-07-02 DIAGNOSIS — Z01.818 PRE-OP TESTING: ICD-10-CM

## 2024-07-02 DIAGNOSIS — Z90.710 H/O ABDOMINAL HYSTERECTOMY: Primary | ICD-10-CM

## 2024-07-02 LAB
ANTIBODY SCREEN: NEGATIVE
B-HCG UR QL: NEGATIVE
RH BLOOD TYPE: POSITIVE
RH BLOOD TYPE: POSITIVE

## 2024-07-02 PROCEDURE — 88342 IMHCHEM/IMCYTCHM 1ST ANTB: CPT | Performed by: OBSTETRICS & GYNECOLOGY

## 2024-07-02 PROCEDURE — 81025 URINE PREGNANCY TEST: CPT

## 2024-07-02 PROCEDURE — 86901 BLOOD TYPING SEROLOGIC RH(D): CPT | Performed by: OBSTETRICS & GYNECOLOGY

## 2024-07-02 PROCEDURE — 0UB10ZZ EXCISION OF LEFT OVARY, OPEN APPROACH: ICD-10-PCS | Performed by: OBSTETRICS & GYNECOLOGY

## 2024-07-02 PROCEDURE — 0UB70ZZ EXCISION OF BILATERAL FALLOPIAN TUBES, OPEN APPROACH: ICD-10-PCS | Performed by: OBSTETRICS & GYNECOLOGY

## 2024-07-02 PROCEDURE — 86850 RBC ANTIBODY SCREEN: CPT | Performed by: OBSTETRICS & GYNECOLOGY

## 2024-07-02 PROCEDURE — 86900 BLOOD TYPING SEROLOGIC ABO: CPT | Performed by: OBSTETRICS & GYNECOLOGY

## 2024-07-02 PROCEDURE — 88307 TISSUE EXAM BY PATHOLOGIST: CPT | Performed by: OBSTETRICS & GYNECOLOGY

## 2024-07-02 PROCEDURE — 76942 ECHO GUIDE FOR BIOPSY: CPT | Performed by: ANESTHESIOLOGY

## 2024-07-02 PROCEDURE — 0UT90ZL RESECTION OF UTERUS, SUPRACERVICAL, OPEN APPROACH: ICD-10-PCS | Performed by: OBSTETRICS & GYNECOLOGY

## 2024-07-02 PROCEDURE — S0028 INJECTION, FAMOTIDINE, 20 MG: HCPCS | Performed by: NURSE ANESTHETIST, CERTIFIED REGISTERED

## 2024-07-02 RX ORDER — HYDROMORPHONE HYDROCHLORIDE 1 MG/ML
0.2 INJECTION, SOLUTION INTRAMUSCULAR; INTRAVENOUS; SUBCUTANEOUS EVERY 5 MIN PRN
Status: DISCONTINUED | OUTPATIENT
Start: 2024-07-02 | End: 2024-07-02 | Stop reason: HOSPADM

## 2024-07-02 RX ORDER — DIPHENHYDRAMINE HYDROCHLORIDE 50 MG/ML
12.5 INJECTION INTRAMUSCULAR; INTRAVENOUS EVERY 4 HOURS PRN
Status: DISCONTINUED | OUTPATIENT
Start: 2024-07-02 | End: 2024-07-05

## 2024-07-02 RX ORDER — ACETAMINOPHEN 500 MG
1000 TABLET ORAL ONCE
Status: DISCONTINUED | OUTPATIENT
Start: 2024-07-02 | End: 2024-07-02 | Stop reason: HOSPADM

## 2024-07-02 RX ORDER — HYDROMORPHONE HYDROCHLORIDE 1 MG/ML
INJECTION, SOLUTION INTRAMUSCULAR; INTRAVENOUS; SUBCUTANEOUS
Status: DISCONTINUED
Start: 2024-07-02 | End: 2024-07-02 | Stop reason: WASHOUT

## 2024-07-02 RX ORDER — ENOXAPARIN SODIUM 100 MG/ML
40 INJECTION SUBCUTANEOUS DAILY
Status: DISCONTINUED | OUTPATIENT
Start: 2024-07-02 | End: 2024-07-05

## 2024-07-02 RX ORDER — SODIUM CHLORIDE, SODIUM LACTATE, POTASSIUM CHLORIDE, CALCIUM CHLORIDE 600; 310; 30; 20 MG/100ML; MG/100ML; MG/100ML; MG/100ML
INJECTION, SOLUTION INTRAVENOUS CONTINUOUS
Status: DISCONTINUED | OUTPATIENT
Start: 2024-07-02 | End: 2024-07-02

## 2024-07-02 RX ORDER — ROCURONIUM BROMIDE 10 MG/ML
INJECTION, SOLUTION INTRAVENOUS AS NEEDED
Status: DISCONTINUED | OUTPATIENT
Start: 2024-07-02 | End: 2024-07-02 | Stop reason: SURG

## 2024-07-02 RX ORDER — DEXTROSE MONOHYDRATE AND SODIUM CHLORIDE 5; .9 G/100ML; G/100ML
INJECTION, SOLUTION INTRAVENOUS CONTINUOUS
Status: DISCONTINUED | OUTPATIENT
Start: 2024-07-02 | End: 2024-07-05

## 2024-07-02 RX ORDER — ONDANSETRON 4 MG/1
4 TABLET, FILM COATED ORAL EVERY 8 HOURS PRN
Status: DISCONTINUED | OUTPATIENT
Start: 2024-07-02 | End: 2024-07-05

## 2024-07-02 RX ORDER — ACETAMINOPHEN 10 MG/ML
INJECTION, SOLUTION INTRAVENOUS AS NEEDED
Status: DISCONTINUED | OUTPATIENT
Start: 2024-07-02 | End: 2024-07-02 | Stop reason: SURG

## 2024-07-02 RX ORDER — KETAMINE HYDROCHLORIDE 50 MG/ML
INJECTION, SOLUTION INTRAMUSCULAR; INTRAVENOUS AS NEEDED
Status: DISCONTINUED | OUTPATIENT
Start: 2024-07-02 | End: 2024-07-02 | Stop reason: SURG

## 2024-07-02 RX ORDER — NALOXONE HYDROCHLORIDE 0.4 MG/ML
0.08 INJECTION, SOLUTION INTRAMUSCULAR; INTRAVENOUS; SUBCUTANEOUS AS NEEDED
Status: DISCONTINUED | OUTPATIENT
Start: 2024-07-02 | End: 2024-07-02 | Stop reason: HOSPADM

## 2024-07-02 RX ORDER — KETOROLAC TROMETHAMINE 30 MG/ML
30 INJECTION, SOLUTION INTRAMUSCULAR; INTRAVENOUS EVERY 6 HOURS
Status: DISPENSED | OUTPATIENT
Start: 2024-07-02 | End: 2024-07-03

## 2024-07-02 RX ORDER — SODIUM CHLORIDE 9 MG/ML
INJECTION, SOLUTION INTRAVENOUS CONTINUOUS
Status: DISCONTINUED | OUTPATIENT
Start: 2024-07-02 | End: 2024-07-05

## 2024-07-02 RX ORDER — MONTELUKAST SODIUM 10 MG/1
10 TABLET ORAL NIGHTLY PRN
Status: DISCONTINUED | OUTPATIENT
Start: 2024-07-03 | End: 2024-07-05

## 2024-07-02 RX ORDER — FAMOTIDINE 10 MG/ML
INJECTION, SOLUTION INTRAVENOUS AS NEEDED
Status: DISCONTINUED | OUTPATIENT
Start: 2024-07-02 | End: 2024-07-02 | Stop reason: SURG

## 2024-07-02 RX ORDER — ONDANSETRON 2 MG/ML
4 INJECTION INTRAMUSCULAR; INTRAVENOUS EVERY 8 HOURS PRN
Status: DISCONTINUED | OUTPATIENT
Start: 2024-07-02 | End: 2024-07-05

## 2024-07-02 RX ORDER — ONDANSETRON 2 MG/ML
4 INJECTION INTRAMUSCULAR; INTRAVENOUS EVERY 6 HOURS PRN
Status: DISCONTINUED | OUTPATIENT
Start: 2024-07-02 | End: 2024-07-05

## 2024-07-02 RX ORDER — PROCHLORPERAZINE EDISYLATE 5 MG/ML
5 INJECTION INTRAMUSCULAR; INTRAVENOUS EVERY 8 HOURS PRN
Status: DISCONTINUED | OUTPATIENT
Start: 2024-07-02 | End: 2024-07-02 | Stop reason: HOSPADM

## 2024-07-02 RX ORDER — METOCLOPRAMIDE HYDROCHLORIDE 5 MG/ML
INJECTION INTRAMUSCULAR; INTRAVENOUS AS NEEDED
Status: DISCONTINUED | OUTPATIENT
Start: 2024-07-02 | End: 2024-07-02 | Stop reason: SURG

## 2024-07-02 RX ORDER — SCOLOPAMINE TRANSDERMAL SYSTEM 1 MG/1
1 PATCH, EXTENDED RELEASE TRANSDERMAL ONCE
Status: COMPLETED | OUTPATIENT
Start: 2024-07-02 | End: 2024-07-05

## 2024-07-02 RX ORDER — MEPERIDINE HYDROCHLORIDE 25 MG/ML
12.5 INJECTION INTRAMUSCULAR; INTRAVENOUS; SUBCUTANEOUS AS NEEDED
Status: DISCONTINUED | OUTPATIENT
Start: 2024-07-02 | End: 2024-07-02 | Stop reason: HOSPADM

## 2024-07-02 RX ORDER — LIDOCAINE HYDROCHLORIDE 10 MG/ML
INJECTION, SOLUTION EPIDURAL; INFILTRATION; INTRACAUDAL; PERINEURAL AS NEEDED
Status: DISCONTINUED | OUTPATIENT
Start: 2024-07-02 | End: 2024-07-02 | Stop reason: SURG

## 2024-07-02 RX ORDER — HYDROMORPHONE HYDROCHLORIDE 1 MG/ML
0.6 INJECTION, SOLUTION INTRAMUSCULAR; INTRAVENOUS; SUBCUTANEOUS EVERY 5 MIN PRN
Status: DISCONTINUED | OUTPATIENT
Start: 2024-07-02 | End: 2024-07-02 | Stop reason: HOSPADM

## 2024-07-02 RX ORDER — GLYCOPYRROLATE 0.2 MG/ML
INJECTION, SOLUTION INTRAMUSCULAR; INTRAVENOUS AS NEEDED
Status: DISCONTINUED | OUTPATIENT
Start: 2024-07-02 | End: 2024-07-02 | Stop reason: SURG

## 2024-07-02 RX ORDER — HEPARIN SODIUM 5000 [USP'U]/ML
5000 INJECTION, SOLUTION INTRAVENOUS; SUBCUTANEOUS ONCE
Status: COMPLETED | OUTPATIENT
Start: 2024-07-02 | End: 2024-07-02

## 2024-07-02 RX ORDER — DEXAMETHASONE SODIUM PHOSPHATE 4 MG/ML
VIAL (ML) INJECTION AS NEEDED
Status: DISCONTINUED | OUTPATIENT
Start: 2024-07-02 | End: 2024-07-02 | Stop reason: SURG

## 2024-07-02 RX ORDER — ONDANSETRON 2 MG/ML
INJECTION INTRAMUSCULAR; INTRAVENOUS AS NEEDED
Status: DISCONTINUED | OUTPATIENT
Start: 2024-07-02 | End: 2024-07-02 | Stop reason: SURG

## 2024-07-02 RX ORDER — ACETAMINOPHEN 500 MG
1000 TABLET ORAL EVERY 8 HOURS SCHEDULED
Status: DISCONTINUED | OUTPATIENT
Start: 2024-07-02 | End: 2024-07-05

## 2024-07-02 RX ORDER — NALOXONE HYDROCHLORIDE 0.4 MG/ML
0.08 INJECTION, SOLUTION INTRAMUSCULAR; INTRAVENOUS; SUBCUTANEOUS
Status: DISCONTINUED | OUTPATIENT
Start: 2024-07-02 | End: 2024-07-05

## 2024-07-02 RX ORDER — SODIUM CHLORIDE, SODIUM LACTATE, POTASSIUM CHLORIDE, CALCIUM CHLORIDE 600; 310; 30; 20 MG/100ML; MG/100ML; MG/100ML; MG/100ML
INJECTION, SOLUTION INTRAVENOUS CONTINUOUS
Status: DISCONTINUED | OUTPATIENT
Start: 2024-07-02 | End: 2024-07-02 | Stop reason: HOSPADM

## 2024-07-02 RX ORDER — MIDAZOLAM HYDROCHLORIDE 1 MG/ML
INJECTION INTRAMUSCULAR; INTRAVENOUS AS NEEDED
Status: DISCONTINUED | OUTPATIENT
Start: 2024-07-02 | End: 2024-07-02 | Stop reason: SURG

## 2024-07-02 RX ORDER — IBUPROFEN 600 MG/1
600 TABLET ORAL EVERY 6 HOURS SCHEDULED
Status: DISCONTINUED | OUTPATIENT
Start: 2024-07-03 | End: 2024-07-05

## 2024-07-02 RX ORDER — HYDROMORPHONE HYDROCHLORIDE 1 MG/ML
0.4 INJECTION, SOLUTION INTRAMUSCULAR; INTRAVENOUS; SUBCUTANEOUS EVERY 5 MIN PRN
Status: DISCONTINUED | OUTPATIENT
Start: 2024-07-02 | End: 2024-07-02 | Stop reason: HOSPADM

## 2024-07-02 RX ORDER — MIDAZOLAM HYDROCHLORIDE 1 MG/ML
1 INJECTION INTRAMUSCULAR; INTRAVENOUS EVERY 5 MIN PRN
Status: DISCONTINUED | OUTPATIENT
Start: 2024-07-02 | End: 2024-07-02 | Stop reason: HOSPADM

## 2024-07-02 RX ORDER — NEOSTIGMINE METHYLSULFATE 1 MG/ML
INJECTION, SOLUTION INTRAVENOUS AS NEEDED
Status: DISCONTINUED | OUTPATIENT
Start: 2024-07-02 | End: 2024-07-02 | Stop reason: SURG

## 2024-07-02 RX ORDER — BUPIVACAINE HYDROCHLORIDE 2.5 MG/ML
INJECTION, SOLUTION EPIDURAL; INFILTRATION; INTRACAUDAL AS NEEDED
Status: DISCONTINUED | OUTPATIENT
Start: 2024-07-02 | End: 2024-07-02 | Stop reason: SURG

## 2024-07-02 RX ORDER — DEXAMETHASONE SODIUM PHOSPHATE 10 MG/ML
INJECTION, SOLUTION INTRAMUSCULAR; INTRAVENOUS AS NEEDED
Status: DISCONTINUED | OUTPATIENT
Start: 2024-07-02 | End: 2024-07-02 | Stop reason: SURG

## 2024-07-02 RX ORDER — ONDANSETRON 2 MG/ML
4 INJECTION INTRAMUSCULAR; INTRAVENOUS EVERY 6 HOURS PRN
Status: DISCONTINUED | OUTPATIENT
Start: 2024-07-02 | End: 2024-07-02 | Stop reason: HOSPADM

## 2024-07-02 RX ORDER — TRAZODONE HYDROCHLORIDE 50 MG/1
50 TABLET ORAL NIGHTLY PRN
Status: DISCONTINUED | OUTPATIENT
Start: 2024-07-02 | End: 2024-07-05

## 2024-07-02 RX ORDER — TRAMADOL HYDROCHLORIDE 50 MG/1
50 TABLET ORAL ONCE
Status: DISCONTINUED | OUTPATIENT
Start: 2024-07-02 | End: 2024-07-02

## 2024-07-02 RX ORDER — KETOROLAC TROMETHAMINE 30 MG/ML
INJECTION, SOLUTION INTRAMUSCULAR; INTRAVENOUS AS NEEDED
Status: DISCONTINUED | OUTPATIENT
Start: 2024-07-02 | End: 2024-07-02 | Stop reason: SURG

## 2024-07-02 RX ORDER — BUPIVACAINE HYDROCHLORIDE 2.5 MG/ML
INJECTION, SOLUTION EPIDURAL; INFILTRATION; INTRACAUDAL AS NEEDED
Status: DISCONTINUED | OUTPATIENT
Start: 2024-07-02 | End: 2024-07-02 | Stop reason: HOSPADM

## 2024-07-02 RX ORDER — MIDAZOLAM HYDROCHLORIDE 1 MG/ML
INJECTION INTRAMUSCULAR; INTRAVENOUS
Status: COMPLETED
Start: 2024-07-02 | End: 2024-07-02

## 2024-07-02 RX ADMIN — METOCLOPRAMIDE HYDROCHLORIDE 10 MG: 5 INJECTION INTRAMUSCULAR; INTRAVENOUS at 13:08:00

## 2024-07-02 RX ADMIN — KETOROLAC TROMETHAMINE 30 MG: 30 INJECTION, SOLUTION INTRAMUSCULAR; INTRAVENOUS at 15:03:00

## 2024-07-02 RX ADMIN — MIDAZOLAM HYDROCHLORIDE 2 MG: 1 INJECTION INTRAMUSCULAR; INTRAVENOUS at 12:56:00

## 2024-07-02 RX ADMIN — ROCURONIUM BROMIDE 20 MG: 10 INJECTION, SOLUTION INTRAVENOUS at 14:18:00

## 2024-07-02 RX ADMIN — ACETAMINOPHEN 1000 MG: 10 INJECTION, SOLUTION INTRAVENOUS at 15:02:00

## 2024-07-02 RX ADMIN — DEXAMETHASONE SODIUM PHOSPHATE 4 MG: 10 INJECTION, SOLUTION INTRAMUSCULAR; INTRAVENOUS at 15:19:00

## 2024-07-02 RX ADMIN — GLYCOPYRROLATE 0.8 MG: 0.2 INJECTION, SOLUTION INTRAMUSCULAR; INTRAVENOUS at 15:09:00

## 2024-07-02 RX ADMIN — BUPIVACAINE HYDROCHLORIDE 60 ML: 2.5 INJECTION, SOLUTION EPIDURAL; INFILTRATION; INTRACAUDAL at 15:19:00

## 2024-07-02 RX ADMIN — SODIUM CHLORIDE, SODIUM LACTATE, POTASSIUM CHLORIDE, CALCIUM CHLORIDE: 600; 310; 30; 20 INJECTION, SOLUTION INTRAVENOUS at 12:56:00

## 2024-07-02 RX ADMIN — LIDOCAINE HYDROCHLORIDE 50 MG: 10 INJECTION, SOLUTION EPIDURAL; INFILTRATION; INTRACAUDAL; PERINEURAL at 13:01:00

## 2024-07-02 RX ADMIN — KETAMINE HYDROCHLORIDE 25 MG: 50 INJECTION, SOLUTION INTRAMUSCULAR; INTRAVENOUS at 14:05:00

## 2024-07-02 RX ADMIN — ROCURONIUM BROMIDE 50 MG: 10 INJECTION, SOLUTION INTRAVENOUS at 13:01:00

## 2024-07-02 RX ADMIN — FAMOTIDINE 20 MG: 10 INJECTION, SOLUTION INTRAVENOUS at 13:08:00

## 2024-07-02 RX ADMIN — NEOSTIGMINE METHYLSULFATE 5 MG: 1 INJECTION, SOLUTION INTRAVENOUS at 15:09:00

## 2024-07-02 RX ADMIN — DEXAMETHASONE SODIUM PHOSPHATE 8 MG: 4 MG/ML VIAL (ML) INJECTION at 13:08:00

## 2024-07-02 RX ADMIN — ONDANSETRON 4 MG: 2 INJECTION INTRAMUSCULAR; INTRAVENOUS at 15:03:00

## 2024-07-02 NOTE — ANESTHESIA PROCEDURE NOTES
Airway  Date/Time: 7/2/2024 1:03 PM  Urgency: elective    Airway not difficult    General Information and Staff    Patient location during procedure: OR  Anesthesiologist: Dmitriy Camacho MD  Resident/CRNA: Steven Gresham CRNA  Performed: CRNA   Performed by: Steven Gresham CRNA  Authorized by: Dmitriy Camacho MD      Indications and Patient Condition  Indications for airway management: anesthesia  Spontaneous Ventilation: absent  Sedation level: deep  Preoxygenated: yes  Patient position: sniffing  MILS maintained throughout  Mask difficulty assessment: 1 - vent by mask  Planned trial extubation    Final Airway Details  Final airway type: endotracheal airway      Successful airway: ETT  Cuffed: yes   Successful intubation technique: direct laryngoscopy  Facilitating devices/methods: intubating stylet  Endotracheal tube insertion site: oral  Blade: Deep  Blade size: #3  ETT size (mm): 7.5    Cormack-Lehane Classification: grade IIA - partial view of glottis  Placement verified by: capnometry   Cuff volume (mL): 6  Measured from: lips  ETT to lips (cm): 21  Number of attempts at approach: 1  Number of other approaches attempted: 0    Additional Comments  Dentition per pre op

## 2024-07-02 NOTE — BRIEF OP NOTE
Pre-Operative Diagnosis: FIBROID UTERUS, MENORHHAGIA, DYSMENORRHEA     Post-Operative Diagnosis: FIBROID UTERUS, MENORHHAGIA, DYSMENORRHEA      Procedure Performed:   Diagnostic laparoscopy, Abdominal-supracervical HYSTERECTOMY, Left SALPINgoophorectomy, Right salpingectomy    Surgeons and Role:     * Gerry Sharma MD - Primary     * Radu Cameron MD - Assisting Surgeon    Assistant(s):        Surgical Findings: see dictated notes     Specimen: uterus, R tube, L tube and ovary     Estimated Blood Loss: Blood Output: 400 mL (7/2/2024  3:09 PM)      Dictation Number:      Gerry Sharma MD  7/2/2024  3:32 PM

## 2024-07-02 NOTE — ANESTHESIA PROCEDURE NOTES
Regional Block    Performed by: Steven Gresham CRNA  Authorized by: Dmitriy Camacho MD      General Information and Staff    Start Time:   Anesthesiologist:  Dmitriy Camacho MD  CRNA:  Steven Gresham CRNA  Performed by:  CRNA  Patient Location:  OR    Block Placement: Post Induction  Site Identification: real time ultrasound guided and image stored and retrievable    Block site/laterality marked before start: site marked  Reason for Block: at surgeon's request and post-op pain management    Preanesthetic Checklist: 2 patient identifers, IV checked, risks and benefits discussed, monitors and equipment checked, pre-op evaluation, timeout performed, anesthesia consent, sterile technique used, no prohibitive neurological deficits and no local skin infection at insertion site      Procedure Details    Patient Position:  Supine  Prep: ChloraPrep    Monitoring:  Cardiac monitor, continuous pulse ox and blood pressure cuff  Block Type:  TAP  Laterality:  Bilateral  Injection Technique:  Single-shot    Needle    Needle Type:  Short-bevel and echogenic  Needle Gauge:  21 G  Needle Length:  110 mm  Needle Localization:  Ultrasound guidance  Reason for Ultrasound Use: appropriate spread of the medication was noted in real time and no ultrasound evidence of intravascular and/or intraneural injection            Assessment    Injection Assessment:  Good spread noted, negative resistance, negative aspiration for heme, incremental injection, low pressure and local visualized surrounding nerve on ultrasound  Paresthesia Pain:  None  Heart Rate Change: No    - Patient tolerated block procedure well without evidence of immediate block related complications.     Medications      Additional Comments    Medication:  Bupivacaine 0.25% 30mL & PF Decadron 2mg per side

## 2024-07-02 NOTE — ANESTHESIA POSTPROCEDURE EVALUATION
[facility]    Elisa Dye Patient Status:  Hospital Outpatient Surgery   Age/Gender 43 year old female MRN [id number]   Location [facility] SURGERY Attending Gerry Sharma MD   Hosp Day # 0 PCP Kale Foster MD       Anesthesia Post-op Note    Diagnostic laparoscopy, Abdomino-supracervical HYSTERECTOMY, Left SALPINgoophorectomy, Right salpingectomy    Procedure Summary       Date: 07/02/24 Room / Location:  MAIN OR 09 / [facility] MAIN OR    Anesthesia Start: 1256 Anesthesia Stop: 1542    Procedure: Diagnostic laparoscopy, Abdomino-supracervical HYSTERECTOMY, Left SALPINgoophorectomy, Right salpingectomy (Bilateral: Abdomen) Diagnosis: (FIBROID UTERUS, MENORHHAGIA, DYSMENORRHEA)    Surgeons: Gerry Sharma MD Anesthesiologist: Dmitriy Camacho MD    Anesthesia Type: general ASA Status: 2            Anesthesia Type: general    Vitals Value Taken Time   /81 07/02/24 1543   Temp 98.2 07/02/24 1543   Pulse 62 07/02/24 1543   Resp 16 07/02/24 1543   SpO2 98 07/02/24 1543       Patient Location: PACU    Anesthesia Type: general    Airway Patency: patent and extubated    Postop Pain Control: adequate    Mental Status: preanesthetic baseline    Nausea/Vomiting: none    Cardiopulmonary/Hydration status: stable euvolemic    Complications: no apparent anesthesia related complications    Postop vital signs: stable    Dental Exam: Unchanged from Preop    Patient to be discharged from PACU when criteria met.

## 2024-07-02 NOTE — H&P
HPI:  Pt with fibroid uterus, menorrhagia and dysmenorrhea. Here for hysterectomy  SIGNIFICANT HISTORY:  HISTORY:  Past Medical History:    Allergic sinusitis    worse if she misses singulair    Anemia    Anesthesia complication    remember waking up during colonoscopy    COVID-19    H/O cardiovascular stress test    nl cgxt at [facility] with Ef of 64%    Hx of motion sickness    Incomplete RBBB    Iron deficiency anemia following bariatric surgery    due to gastric bypass saw Dr. Butts, failed tx with otc iron. Getting iv iron with Dr. Dunn of heme 22    Irregular periods    improved 19    Migraine    Overweight and obesity    weight prior to gastric bypass was 379    PONV (postoperative nausea and vomiting)    Varicose vein    R leg, has worn support hose when working, for last few years. will f/u with vascular    Ventral hernia      Past Surgical History:   Procedure Laterality Date    Bowel resection  2008    at [facility], for sbo due to adhesions          Colonoscopy  2010    Dr. Robles, int hemorrhoids.    Colonoscopy  10/2020    normal- repeat 7 yrs    Colonoscopy N/A 10/21/2020    Procedure: COLONOSCOPY;  Surgeon: Ralf Rocha MD;  Location:  ENDOSCOPY    Gastric bypass,obesity,sb reconstruc      Dr. Christian    Other surgical history Left     benign growth neck    Upper gi endoscopy,diagnosis      at [facility]--normal per patient    Upper gi endoscopy,diagnosis  2010    s/p gastric bypass surgery    [place] teeth removed        Family History   Problem Relation Age of Onset    Diabetes Father     Heart Disorder Father     Hypertension Father     No Known Problems Mother     No Known Problems Son     No Known Problems Brother     No Known Problems Brother       Social History     Socioeconomic History    Marital status: Single    Number of children: 1   Occupational History    Occupation: hairdresser   Tobacco Use    Smoking status:  Former     Current packs/day: 0.00     Average packs/day: 0.5 packs/day for 14.0 years (7.0 ttl pk-yrs)     Types: Cigarettes     Start date: 2001     Quit date: 2015     Years since quittin.5    Smokeless tobacco: Never   Vaping Use    Vaping status: Never Used   Substance and Sexual Activity    Alcohol use: Yes     Alcohol/week: 0.0 standard drinks of alcohol     Comment: socially    Drug use: Yes     Types: Cannabis     Comment: THC nightly as needed GUMMIES 2/week    Sexual activity: Yes     Partners: Male     Birth control/protection: Condom   Other Topics Concern     Service No    Blood Transfusions Yes     Comment: 2008 after bowel resection    Occupational Exposure Yes     Comment: hairstylist    Stress Concern Yes     Comment: more stress with running own business    Exercise Yes     Comment: biking in nicer weather 3-4 x a week, 22    Seat Belt Yes    Self-Exams No     Comment: discussed again 2/10/20,  19,  13, 14, 16, 17, 18, 21, 22   Social History Narrative    One son, , smokes, few drinks a week        Past Surgical History:   Procedure Laterality Date    Bowel resection  2008    at [facility], for sbo due to adhesions          Colonoscopy  2010    Dr. Robles, int hemorrhoids.    Colonoscopy  10/2020    normal- repeat 7 yrs    Colonoscopy N/A 10/21/2020    Procedure: COLONOSCOPY;  Surgeon: Ralf Rocha MD;  Location:  ENDOSCOPY    Gastric bypass,obesity,sb reconstruc      Dr. Christian    Other surgical history Left 2009    benign growth neck    Upper gi endoscopy,diagnosis      at [facility]--normal per patient    Upper gi endoscopy,diagnosis  2010    s/p gastric bypass surgery    [place] teeth removed       OB History    Para Term  AB Living   3 2 1 0 1 1   SAB IAB Ectopic Multiple Live Births   1 0 0 0 1   Obstetric Comments   Dr. Sharma 17, ,  ,      Social History     Socioeconomic History    Marital status: Single    Number of children: 1   Occupational History    Occupation: hairdresser   Tobacco Use    Smoking status: Former     Current packs/day: 0.00     Average packs/day: 0.5 packs/day for 14.0 years (7.0 ttl pk-yrs)     Types: Cigarettes     Start date: 2001     Quit date: 2015     Years since quittin.5    Smokeless tobacco: Never   Vaping Use    Vaping status: Never Used   Substance and Sexual Activity    Alcohol use: Yes     Alcohol/week: 0.0 standard drinks of alcohol     Comment: socially    Drug use: Yes     Types: Cannabis     Comment: THC nightly as needed GUMMIES 2/week    Sexual activity: Yes     Partners: Male     Birth control/protection: Condom   Other Topics Concern     Service No    Blood Transfusions Yes     Comment: 2008 after bowel resection    Occupational Exposure Yes     Comment: hairstylist    Stress Concern Yes     Comment: more stress with running own business    Exercise Yes     Comment: biking in nicer weather 3-4 x a week, 22    Seat Belt Yes    Self-Exams No     Comment: discussed again 2/10/20,  19,  13, 14, 16, 17, 18, 21, 22   Social History Narrative    One son, , smokes, few drinks a week         ROS:  GENERAL HEALTH: feels well otherwise  GI: denies nausea, vomiting, constipation, diarrhea; no rectal bleeding; no heartburn  GYNE/: no dysuria, urgency or frequency; no hx abnormal pap smear; no vaginal discharge; no dyspareunia; periods regular; no urinary incontinence  MUSCULOSKELETAL: no joint complaints upper or lower extremities  PSYCHE: no symptoms of depression or anxiety  HEMATOLOGY: denies hx anemia; denies bruising or excessive bleeding  ENDOCRINE:  denies unexpected wt gain or wt loss  ALLERGY/IMM.: denies food or seasonal allergies    PHYSICAL EXAM:  GENERAL: well developed, well nourished, in no apparent distress  SKIN: no  rashes, no suspicious lesions  HEENT: normal  LUNGS: clear to auscultation  CARDIOVASCULAR: normal S1, S2, RRR  ABDOMEN: Soft, non distended; non tender, uterus palpated to within 4cm of umbilicus  GYNE/: External Genitalia: Normal                      Vagina: normal                      Uterus: av, mobile, non tender, 16 week size                     Cervix: pink and clear, no lesions                     Adnexa: non tender, no masses  EXTREMITIES:  non tender without edema    Imaging:  The uterus is anteverted and measures 13.4 x 11.9 x 8.2 cm. Total volume is 688.7 mL.   The myometrium is bulky and heterogenous with multiple uterine fibroids. There is a 3.7 x 5.1 x 3.5   cm right lateral intramural uterine fibroid. There is a 5.4 x 5.1 x 3.8 cm intramural fundal   fibroid. There is a 5.4 x 4.9 x 4.1 cm left posterolateral uterine fibroid.   The endometrial echo complex is partially obscured due to myomatous uterus but measures up to 7.9   mm.     RIGHT OVARY:   The right ovary is seen on transabdominal images only and measures 2.4 x 1.9 x 2.1 cm. Total volume   is 5.0 mL.        LEFT OVARY:   The left ovary is seen on transabdominal images only and measures 3.7 x 2.5 x 3.8 cm. Total volume   is 17.9 mL.     Within the left adnexa there is 4.2 x 4.2 cm hypoechoic lesion with partial internal septation and   low-level echoes which could represent hydrosalpinx or complex cyst.       ASSESMENT/PLAN:  1. Surgery to be performed:robotic assisted total laparoscopic hysterectomy, bilat salpingectomies  2. Indication:fibroid uterus, menorrhagia, dysmenorrhea  3. The procedure, its risks, benefits, possible complications and alternatives discussed with the patient.  She understands and agrees to the procedure.      Id#[id number]

## 2024-07-02 NOTE — PLAN OF CARE
Patient admitted via bed from PACU  Oriented to room.   Safety precautions initiated.   Bed in low position.  Call light in reach.

## 2024-07-02 NOTE — PLAN OF CARE
Pt here from: Home with mom   Neuro: A&Ox4, VSS, RA, , IS. Denies cough, chest pain, and SOB.   GI: Abdomen soft, passing gas and belching. Denies nausea.   : Marquez intact draining clear yellow urine.   Pain controlled with PCA pump.   Resting in bed.   Incisions: Low mini midline id CDI.   Diet: Tolerating clears.   IVF running per order through PIV R AC.   All appropriate safety measures in place. All questions and concerns addressed. Bed locked and in lowest position. Call light in reach.

## 2024-07-02 NOTE — ANESTHESIA PREPROCEDURE EVALUATION
PRE-OP EVALUATION    Patient Name: Elisa Dye    Admit Diagnosis: FIBROID UTERUS, MENORHHAGIA, DYSMENORRHEA    Pre-op Diagnosis: FIBROID UTERUS, MENORHHAGIA, DYSMENORRHEA    XI ROBOT-ASSISTED TOTAL LAPAROSCOPIC HYSTERECTOMY AND BILATERAL SALPINGECTOMIES    Anesthesia Procedure: XI ROBOT-ASSISTED TOTAL LAPAROSCOPIC HYSTERECTOMY AND BILATERAL SALPINGECTOMIES (Bilateral: Abdomen)    Surgeons and Role:     * Gerry Sharma MD - Primary    Pre-op vitals reviewed.  Temp: 97.6 °F (36.4 °C)  Pulse: 62  Resp: 16  BP: 134/82  SpO2: 100 %  Body mass index is 32.92 kg/m².    Current medications reviewed.  Hospital Medications:   acetaminophen (Tylenol Extra Strength) tab 1,000 mg  1,000 mg Oral Once    scopolamine (Transderm-Scop) 1 MG/3DAYS patch 1 patch  1 patch Transdermal Once    lactated ringers infusion   Intravenous Continuous    [COMPLETED] heparin (Porcine) 5000 UNIT/ML injection 5,000 Units  5,000 Units Subcutaneous Once    ceFAZolin (Ancef) 2g in 10mL IV syringe premix  2 g Intravenous Once       Outpatient Medications:      Cholecalciferol (VITAMIN D-3 OR), Take by mouth daily., Disp: , Rfl:       BIOTIN OR, Take by mouth daily., Disp: , Rfl:       COLLAGEN OR, Take by mouth daily., Disp: , Rfl:       Nutritional Supplements (JUICE PLUS FIBRE OR), Take by mouth daily., Disp: , Rfl:       NAPROXEN 500 MG Oral Tab, TAKE 1 TABLET(500 MG) BY MOUTH TWICE DAILY WITH MEALS (Patient taking differently: as needed.  ), Disp: 60 tablet, Rfl: 2      Montelukast Sodium 10 MG Oral Tab, TAKE 1 TABLET(10 MG) BY MOUTH DAILY, Disp: 90 tablet, Rfl: 4        Allergies: Vicodin [hydrocodone-acetaminophen] and Seasonal      Anesthesia Evaluation    Patient summary reviewed.    Anesthetic Complications  (+) history of anesthetic complications  History of: PONV       GI/Hepatic/Renal      (+) GERD and well controlled                          Cardiovascular        Exercise tolerance: good     MET: >4    (+) obesity                                        Endo/Other               (+) anemia                   Pulmonary                           Neuro/Psych                   (+) headaches           Patient Active Problem List:     Class 3 severe obesity due to excess calories without serious comorbidity with body mass index (BMI) of 40.0 to 44.9 in adult (HCC)     Intestinal bypass or anastomosis status     Reflux esophagitis     Iron deficiency anemia     Poor iron absorption     Neck pain     Shoulder pain, left     Varicose vein     Allergic sinusitis     Iron deficiency anemia following bariatric surgery     Intramural leiomyoma of uterus     Incisional hernia, without obstruction or gangrene     Irregular periods     Idiopathic steatorrhea     Varicose veins with pain            Past Surgical History:   Procedure Laterality Date    Bowel resection  2008    at [facility], for sbo due to adhesions          Colonoscopy  2010    Dr. Robles, int hemorrhoids.    Colonoscopy  10/2020    normal- repeat 7 yrs    Colonoscopy N/A 10/21/2020    Procedure: COLONOSCOPY;  Surgeon: Ralf Rocha MD;  Location:  ENDOSCOPY    Gastric bypass,obesity,sb reconstruc      Dr. Christian    Other surgical history Left 2009    benign growth neck    Upper gi endoscopy,diagnosis      at [facility]--normal per patient    Upper gi endoscopy,diagnosis  2010    s/p gastric bypass surgery    [place] teeth removed       Social History     Tobacco Use    Smoking status: Former     Current packs/day: 0.00     Average packs/day: 0.5 packs/day for 14.0 years (7.0 ttl pk-yrs)     Types: Cigarettes     Start date: 2001     Quit date: 2015     Years since quittin.5    Smokeless tobacco: Never   Substance Use Topics    Alcohol use: Yes     Alcohol/week: 0.0 standard drinks of alcohol     Comment: socially     History   Drug Use    Types: Cannabis     Comment: THC nightly as needed GUMMIES 2/week      Available pre-op labs reviewed.  Lab Results   Component Value Date    WBC 4.9 06/22/2024    RBC 3.97 06/22/2024    HGB 11.7 (L) 06/22/2024    HCT 36.9 06/22/2024    MCV 92.9 06/22/2024    MCH 29.5 06/22/2024    MCHC 31.7 06/22/2024    RDW 12.6 06/22/2024    .0 06/22/2024               Airway      Mallampati: II  Mouth opening: >3 FB  TM distance: 4 - 6 cm  Neck ROM: full Cardiovascular      Rhythm: regular  Rate: normal     Dental             Pulmonary    Pulmonary exam normal.                 Other findings              ASA: 2   Plan: general  NPO status verified and patient meets guidelines.    Post-procedure pain management plan discussed with surgeon and patient.      Plan/risks discussed with: patient and mother                Present on Admission:  **None**

## 2024-07-03 LAB
ERYTHROCYTE [DISTWIDTH] IN BLOOD BY AUTOMATED COUNT: 12.3 %
HCT VFR BLD AUTO: 31 %
HGB BLD-MCNC: 10.3 G/DL
MCH RBC QN AUTO: 30 PG (ref 26–34)
MCHC RBC AUTO-ENTMCNC: 33.2 G/DL (ref 31–37)
MCV RBC AUTO: 90.4 FL
PLATELET # BLD AUTO: 252 10(3)UL (ref 150–450)
RBC # BLD AUTO: 3.43 X10(6)UL
WBC # BLD AUTO: 9.9 X10(3) UL (ref 4–11)

## 2024-07-03 PROCEDURE — 85027 COMPLETE CBC AUTOMATED: CPT | Performed by: OBSTETRICS & GYNECOLOGY

## 2024-07-03 RX ORDER — GABAPENTIN 300 MG/1
300 CAPSULE ORAL 3 TIMES DAILY
Status: DISCONTINUED | OUTPATIENT
Start: 2024-07-03 | End: 2024-07-05

## 2024-07-03 NOTE — PROGRESS NOTES
A/Ox4, RA, IVF, VSS, tolerating soft diet well.  Voiding via fu.  Pain managed with PCA pump.  Pt updated on plan of care, call light and belongings within reach, questions and concerns addressed.

## 2024-07-03 NOTE — PROGRESS NOTES
S: pt without complaints.  no flatus  O: VS-Temp:  [96.5 °F (35.8 °C)-98.7 °F (37.1 °C)] 98 °F (36.7 °C)  Pulse:  [44-74] 51  Resp:  [16-29] 18  BP: (102-134)/(60-82) 123/66  SpO2:  [90 %-100 %] 99 %       I/O last 24 hours-  Intake/Output Summary (Last 24 hours) at 7/3/2024 0717  Last data filed at 7/3/2024 0622  Gross per 24 hour   Intake 1377 ml   Output 800 ml   Net 577 ml          Abdomen: soft, ND, NT  bandage- CDI       Extremities: no edema, NT Bilateral lower extremities       CBC:   Lab Results   Component Value Date    WBC 9.9 07/03/2024    RBC 3.43 07/03/2024    HGB 10.3 07/03/2024    HCT 31.0 07/03/2024    MCV 90.4 07/03/2024    MCH 30.0 07/03/2024    MCHC 33.2 07/03/2024    RDW 12.3 07/03/2024    .0 07/03/2024     A/P:      1. POD#1 s/p abdominal supracervical hysterectomy, LSO, R salpingectomy      2. Doing well      3. Routine orders

## 2024-07-03 NOTE — OPERATIVE REPORT
[facility]    PATIENT'S NAME: NAREN ARIAS   ATTENDING PHYSICIAN: Gerry Sharma M.D.   OPERATING PHYSICIAN: Gerry Sharma M.D.   PATIENT ACCOUNT#:   [id number]    LOCATION:  [address] [place] [address]  MEDICAL RECORD #:   [id number]       YOB: 1980  ADMISSION DATE:       07/02/2024      OPERATION DATE:  07/02/2024    OPERATIVE REPORT      PREOPERATIVE DIAGNOSIS:  Fibroid uterus, menorrhagia, dysmenorrhea.  POSTOPERATIVE DIAGNOSIS:  Fibroid uterus, menorrhagia, dysmenorrhea, pelvic endometriosis, and extensive bowel adhesions.  PROCEDURE:  Diagnostic laparoscopy converted to abdominal supracervical hysterectomy, left salpingo-oophorectomy, and right salpingectomy.    ASSISTANT SURGEON:   Radu Cameron MD.    ANESTHESIA:  General endotracheal.    PATHOLOGY:  Uterus without cervix, left tube and ovary, right fallopian tube.    ESTIMATED BLOOD LOSS:  400 mL.    DRAINS:  Marquez to gravity.    COMPLICATIONS:  None.    DISPOSITION:  Patient taken to the recovery room in good condition.    FINDINGS:  Multi-fibroid uterus, extensive upper and lower abdominal bowel adhesions, endometrioma of the left ovary.  The pelvic adhesions, sigmoid colon to the posterior cervix.  Omental adhesions to the anterior abdominal wall.  Left mild hydrosalpinx, right hydrosalpinx, and right ovary with a small superficial implant of endometriosis.     OPERATIVE TECHNIQUE:  After assuring informed consent, the patient was taken to the operating room, given general anesthesia, placed in dorsal lithotomy position, prepped and draped in usual sterile fashion.  A Marquez catheter was placed in the bladder.  A speculum was placed in the vagina.  The anterior lip of the cervix was visualized, grasped with a single-tooth tenaculum.  The cervix was dilated to 7 mm.  Uterus was sounded previously to 9 cm.  A stay suture was placed through the anterior cervix, and the Advincula uterine manipulator was placed into the intrauterine cavity and  cinched down into the vaginal fornices.  Speculum had been previously removed.  Over gloves were removed, and attention was turned to the abdomen.  A stab incision was made in the inferior umbilical area, and a Veress needle was placed into the intra-abdominal cavity.  The abdomen was insufflated with warm carbon dioxide gas until 15 mmHg pressure was reached.  Because she had 2 previous abdominal surgeries, we decided to go in with a 5 mm laparoscope through her umbilicus before trying to put an upper abdominal port.  This was done without complication.  The laparoscope was placed in the intra-abdominal cavity, and we noted extensive adhesions that were in a small clear spot around the umbilicus.  However, at 360 degrees throughout the pelvic cavity, there were omental and bowel adhesions to the anterior abdominal wall.  At this point, it was deemed unsafe to try to put the robotic ports through the upper abdominal wall and determination was made to proceed with an abdominal hysterectomy.  There was no evidence of bowel adhesion with the initial Veress needle insertion or infraumbilical trocar insertion.  This port was removed.  The skin was closed with an interrupted subcuticular suture of 4-0 Monocryl.  The uterine manipulator was removed vaginally, and we proceeded to perform an abdominal hysterectomy.  We made a vertical midline incision from the symphysis pubis to just inferior to the umbilicus, gently going down through the layers and reached the fascia.  The rectus muscles were  in the midline.  The peritoneum was identified, elevated with the pickups and entered sharply with the Metzenbaum scissors as well as some blunt dissection.  No bowel injury had occurred during this phase.  The peritoneal incision was extended superiorly and inferiorly with good visualization of the bladder.  A self-retaining retractor was placed in the abdominal wall.  The were some bowel adhesions to the anterior abdominal  wall.  These were taken down with the Bovie and Metzenbaum scissors.  Excellent hemostasis was assured.  Then, the self-retaining retractor was placed into the abdomen.  Findings were as noted above.  There was a large endometrioma noted on the right ovary.  This was also stuck to the posterior cervix deep in the pelvis.  We did have access to the left round ligament.  It was doubly clamped, transected, and suture ligated.  A window was created and took down the bladder adhesions from the anterior uterus without injury to the bladder, and the left half of the bladder flap was made.  The ureter was palpated digitally, well away from the operative field.  The utero-ovarian ligament was desiccated and transected.  The posterior leaf of the broad ligament was incised downward.  There were some denser adhesions in the broad ligament to the uterus.  These were taken down with curved Leatha clamps until reached the level of the uterine arteries.  They were clamped and cut, and suture ligated.  Attention was turned to the patient's right side and repeated with the broad ligament clamped and cut, and suture ligated.  Utero-ovarian ligament isolated, clamped with curved a Leatha clamp, transected, and suture ligated.  The anterior leaf of the broad ligament was incised toward the midline.  Uterine artery and vein were skeletonized, clamped with a curved Leatha.  At this stage, before we were able to get down to the uterine artery and vein on the patient's right side, there was a large anterolateral calcified fibroid that was resected out to decompress the uterus and give us a better view.  Once that was out of the way, we were then able to skeletonize, doubly clamp and transect the uterine artery and vein.  A small area of the cardinal ligament was clamped with a straight Leatha clamp, suture ligated on the right side as well as the left side, and there were still adhesions from the ovary to the posterior cervix and the  sigmoid colon to the posterior cervix.  It was determined that we were going to perform a supracervical hysterectomy and not risk injury to ureters or bowel by attempting to remove the cervix.  The uterus was then removed from the operative field.  The ovary was bluntly and sharply dissected away from the posterior cervix and sigmoid colon.  No bowel injury.  At this stage, the IP ligament was isolated and free from adhesions.  The ureter was again identified well away from the operative field.  Because this was an endometrioma, the ovary was quite large, decided to remove the tube and ovary on the patient's left side.  This was clamped with a straight Leatha clamps, transected.  Pedicle was suture ligated, followed by free tie of 0 Vicryl.  Attention was then turned to the patient's right side again where the right tube and ovary were inspected, noted to be normal.  However, we did remove the right fallopian tube with the LigaSure device, and this was sent to Pathology.  The cervical stump was closed with interrupted figure-of-N sutures of 0 Vicryl.  Excellent hemostasis was assured.  The pelvis was irrigated with warm normal saline.  Packing laparotomy sponges were removed from the abdomen.  The self-retaining retractor was removed.  The fascia was closed with a running suture of 0 looped PDS, and the subcutaneous tissues were irrigated with warm normal saline and inspected and any small bleeders were fulgurated.  Subcutaneous tissues were closed with a running suture of 2-0 plain catgut suture.  Skin was closed with staples.  Bandage was placed over the incision.  The patient was awoken from anesthesia and transferred to recovery room in good condition.    Dictated By Gerry Sharma M.D.  d: 07/02/2024 15:47:02  t: 07/02/2024 22:20:52  [facility] [id number]  SK/   22:20:52  Job [id number]  SK/

## 2024-07-04 RX ORDER — TRAMADOL HYDROCHLORIDE 50 MG/1
100 TABLET ORAL EVERY 6 HOURS PRN
Status: DISCONTINUED | OUTPATIENT
Start: 2024-07-04 | End: 2024-07-05

## 2024-07-04 RX ORDER — DOCUSATE SODIUM 100 MG/1
100 CAPSULE, LIQUID FILLED ORAL 2 TIMES DAILY
Status: DISCONTINUED | OUTPATIENT
Start: 2024-07-04 | End: 2024-07-05

## 2024-07-04 RX ORDER — GABAPENTIN 300 MG/1
300 CAPSULE ORAL 3 TIMES DAILY
Qty: 12 CAPSULE | Refills: 0 | Status: SHIPPED | OUTPATIENT
Start: 2024-07-04

## 2024-07-04 RX ORDER — TRAMADOL HYDROCHLORIDE 50 MG/1
50 TABLET ORAL EVERY 6 HOURS PRN
Status: DISCONTINUED | OUTPATIENT
Start: 2024-07-04 | End: 2024-07-05

## 2024-07-04 RX ORDER — TRAMADOL HYDROCHLORIDE 50 MG/1
TABLET ORAL EVERY 6 HOURS PRN
Qty: 16 TABLET | Refills: 0 | Status: SHIPPED | OUTPATIENT
Start: 2024-07-04

## 2024-07-04 NOTE — PROGRESS NOTES
S: pt without complaints.  Positive flatus. Still needing dilaudid PCA.   O: VS-Temp:  [98 °F (36.7 °C)-98.6 °F (37 °C)] 98.4 °F (36.9 °C)  Pulse:  [49-58] 57  Resp:  [16-18] 16  BP: (102-146)/(43-77) 113/43  SpO2:  [97 %-99 %] 99 %       Abdomen: soft, ND, NT  Incision- CDI       Extremities: no edema, NT Bilateral lower extremities       CBC:    A/P:      1. POD #2 s/p abdominal supracervical hysterectomy      2. Doing well      3. Probable Home tomorrow.       4. Will try to decrease PCA use and see if po meds control her pain well. If so, she may decide to go home today

## 2024-07-04 NOTE — PROGRESS NOTES
Assumed care this AM. LennyO x4. RA. POD #1 abd hysterectomy, midline incision with staples. On PCA, used minimally, pain better controlled with tramadol today and did not have a migraine from medication, scheduled tylenol, motrin, and gabapentin as well. Voids. Stool softener added today, +Bowel sounds, +flatus. Tolerating diet. Up self. Potential DC later if pain continues to be managed on ultram. POC reviewed, call light within reach.

## 2024-07-04 NOTE — PROGRESS NOTES
Patient alert x 4, room air, denies chest pain and shortness of breath. Tolerating general diet, belching and passing gas. C/o abdominal pain, managed with PCA pump. Voiding without difficulty. Ambulating ad lisset. Plan of care discussed with patient, all questions addressed. Call light within reach

## 2024-07-05 VITALS
TEMPERATURE: 98 F | OXYGEN SATURATION: 98 % | DIASTOLIC BLOOD PRESSURE: 73 MMHG | RESPIRATION RATE: 18 BRPM | BODY MASS INDEX: 32.74 KG/M2 | SYSTOLIC BLOOD PRESSURE: 119 MMHG | HEART RATE: 54 BPM | WEIGHT: 191.81 LBS | HEIGHT: 64 IN

## 2024-07-05 NOTE — PROGRESS NOTES
S: pt without complaints.  Positive flatus  O: VS-Temp:  [98.2 °F (36.8 °C)-98.3 °F (36.8 °C)] 98.3 °F (36.8 °C)  Pulse:  [54-59] 54  Resp:  [16-18] 18  BP: (119-124)/(62-73) 119/73  SpO2:  [98 %] 98 %       Abdomen: soft, ND, NT  Incision- CDI       Extremities: no edema, NT Bilateral lower extremities       CBC:    A/P:      1. POD #3 s/p abd supracervical hyst      2. Doing well      3. Home today

## 2024-07-05 NOTE — DISCHARGE INSTRUCTIONS
Post op Instructions  Hysterectomy    Deedee Vela, Zachary, Mai, Elver Butts,  Manuel, Bob, Joe, Weeks    Thank you for choosing us for your gynecologic care. These instructions are a guide for you as you recovery from your recent surgery. Please feel free to call or contact us by [employer] with your questions. Keep in mind that [employer] messages are only answered during normal business hours.    Pain Control: It is normal and expected to have pain after surgery. You may experience some pelvic cramping, abdominal discomfort, and incisional pain. You may also experience a sore throat, shoulder discomfort, or muscle aches. Your doctor will give you a prescription for a narcotic pain medication.  We recommend you take 600 mg Ibuprofen (Motrin or Advil) every 6 hours, alternating with 1-2 of the narcotic every 6 hours, so you are taking something every 3 hours. If you are unable to take Ibuprofen, you can substitute 2 Acetaminophen/Tylenol. Keep in mind that most narcotics also contain Acetaminophen and DO NOT exceed 4 grams (4000 mg) of Acetaminophen daily.   Most patients only need narcotic pain medications for about a week. If you are still requiring narcotic medications after 2 weeks, please contact our office.     You may experience nausea from general anesthesia and/or prescription pain medications. Your doctor may give you a prescription to help with the nausea.    Activity:  You should rest frequently for the first week after surgery, gradually increasing your activity as you feel better. We encourage you to walk around the house every couple hours to prevent complications, such as blood clots in your legs or constipation. It is okay to walk up and down stairs when you get home.    Showering is fine the day after surgery.  The dressings can be removed after 24 hours.  If your surgeon used skin glue, the glue will wear off on it’s own within a week or two. You may wear a band-aid or small  dressing if desired. Please clean the incisions gently with mild soap and water as needed.      No driving for 1-2 weeks after surgery. You should be off prescription pain medications for 24 hours before driving.     Certain activities need to be avoided until approved by your physician at the post   operative visit. NO heavy lifting (nothing more than a gallon of milk), heavy exercise, douching, or intercourse. DO NOT use tampons for vaginal spotting.  Vaginal spotting is normal for up to 6 weeks as there are stitches at the top of the vagina.    If you were using vaginal estrogen prior to surgery, DO NOT resume this until your physician has examined you and authorized its use again.     Bladder/bowel function: It is normal for your bladder to feel different after hysterectomy. If you have pain during urination that persists more than a few days after surgery, or starts within a few days of surgery, please call our office.      It is important to prevent constipation in the postoperative period.  Both anesthesia and narcotics can contribute to this. We encourage you to use a fiber supplement, stool softener, or miralax as needed to prevent constipation. Avoid other medications that can cause constipation, such as calcium supplements, until bowel function is normal again.    Diet: You may resume your normal diet once your appetite returns after surgery. Some patients experience nausea from anesthesia or narcotics: we recommend you start with a light diet. Do not drink alcohol or use other sedating medications (sleep aids, sedating cold medications) if taking prescription pain medications.    Resume normal medications as instructed.      Please call our office if you experience any of the following symptoms:   Temperature over 100.5 degrees   Vaginal bleeding heavier than a moderate period   Significant swelling, redness, or discharge at the incision sites   Severe abdominal/pelvic pain   Shortness of breath or chest  pain   New onset of leg pain and /or swelling     If a specimen was sent to pathology, you can expect a call from your doctor  within 10 days with the report.    You should have a post op appointment in 2 weeks.   Please call with any other questions or concerns.  Office Number: [phone]

## 2024-07-05 NOTE — DISCHARGE SUMMARY
Admission date: 7/2/24  Discharge date: 7/5/24  Admission diagnosis: fibroid uterus, menorrhagia  Discharge diagnosis: same+ endometriosis, abd and pelvic adhesions. Endometrioma  Operative Procedure: total laparoscopic hysterectomy, bilateral salpingectomies  Surgeon: DARRIAN Sharma M.D.  Hospital course: uncomplicated  Discharge medications: tramadol and ibuprofen 600mg  Follow up 1 week in office for incision check

## 2024-07-05 NOTE — PLAN OF CARE
Pt vitals stable, A&O x4. Denied chest pain and shortness of breath. Stated having abdominal pain, PRN meds given. Tolerating diet without nausea. Midline noted to abdomen. Bed locked in low position, call light in reach, rounding provided.

## 2024-07-05 NOTE — PLAN OF CARE
Pt A&Ox4, resting in bed.  Resp: RA  GI/: voids normally; constipated (stool softener given)  Activity/Safety: up ad lisset  Skin: midline incisions w/staples, c/d/i  Pain: ibuprofen given  Pt updated on and agreeable to POC; await OB clearance for discharge.    1350: pt discharged to son's car at ER entrance via wheelchair w/Sweetie CNA. IV removed. Reviewed AVS with pt and all questions answered. All personal belongings returned.

## 2025-02-24 ENCOUNTER — HOSPITAL ENCOUNTER (OUTPATIENT)
Dept: MAMMOGRAPHY | Age: 45
Discharge: HOME | End: 2025-02-24
Attending: OBSTETRICS & GYNECOLOGY
Payer: MEDICAID

## 2025-02-24 DIAGNOSIS — Z12.31 ENCOUNTER FOR SCREENING MAMMOGRAM FOR MALIGNANT NEOPLASM OF BREAST: ICD-10-CM

## 2025-02-24 PROCEDURE — 77063 BREAST TOMOSYNTHESIS BI: CPT | Performed by: OBSTETRICS & GYNECOLOGY

## 2025-02-24 PROCEDURE — 77067 SCR MAMMO BI INCL CAD: CPT | Performed by: OBSTETRICS & GYNECOLOGY

## 2025-07-09 ENCOUNTER — HOSPITAL ENCOUNTER (OUTPATIENT)
Dept: CV DIAGNOSTICS | Facility: HOSPITAL | Age: 45
Discharge: HOME | End: 2025-07-09
Attending: NURSE PRACTITIONER
Payer: MEDICAID

## 2025-07-09 DIAGNOSIS — R94.31 ABNORMAL EKG: ICD-10-CM

## 2025-07-09 PROCEDURE — 76376 3D RENDER W/INTRP POSTPROCES: CPT | Performed by: NURSE PRACTITIONER

## 2025-07-09 PROCEDURE — 93306 TTE W/DOPPLER COMPLETE: CPT | Performed by: NURSE PRACTITIONER

## (undated) DEVICE — GLOVE SUR 8 SENSICARE PI PIP CRM PWD F

## (undated) DEVICE — CANNULA SEAL

## (undated) DEVICE — SYRINGE MED 50ML LL TIP DISP

## (undated) DEVICE — AIRSEAL TRI-LUMEN LILTERED TUBE SET: Brand: AIRSEAL

## (undated) DEVICE — AIRSEAL 5 MM ACCESS PORT AND LOW PROFILE OBTURATOR WITH BLADELESS OPTICAL TIP, 120 MM LENGTH: Brand: AIRSEAL

## (undated) DEVICE — MEDI-VAC NON-CONDUCTIVE SUCTION TUBING: Brand: CARDINAL HEALTH

## (undated) DEVICE — FILTERLINE NASAL ADULT O2/CO2

## (undated) DEVICE — COVER,MAYO STAND,STERILE: Brand: MEDLINE

## (undated) DEVICE — STERILE DRAPE FOR USE WITH SITUATE ROOM SCANNER: Brand: SITUATE

## (undated) DEVICE — PENCIL SMK EVAC L10FT MPLR BLDE JAW OPN

## (undated) DEVICE — GLOVE SURG SENSICARE SZ 7

## (undated) DEVICE — BLADELESS OBTURATOR: Brand: WECK VISTA

## (undated) DEVICE — Device: Brand: DEFENDO AIR/WATER/SUCTION AND BIOPSY VALVE

## (undated) DEVICE — BIPOLAR GRASPER, LONG: Brand: ENDOWRIST

## (undated) DEVICE — DRAPE,TOP,102X53,STERILE: Brand: MEDLINE

## (undated) DEVICE — LAPAROVUE VISIBILITY SYSTEM LAPAROSCOPIC SOLUTIONS: Brand: LAPAROVUE

## (undated) DEVICE — ABSORBABLE WOUND CLOSURE DEVICE: Brand: SYNETURE

## (undated) DEVICE — COLUMN DRAPE

## (undated) DEVICE — ADHESIVE SKIN TOP FOR WND CLSR DERMBND ADV

## (undated) DEVICE — MEGA SUTURECUT ND: Brand: ENDOWRIST

## (undated) DEVICE — ANTIBACTERIAL VIOLET BRAIDED (POLYGLACTIN 910), SYNTHETIC ABSORBABLE SUTURE: Brand: COATED VICRYL

## (undated) DEVICE — SUT COAT VCRL 0 27IN CT-1 ABSRB UD 36MM 1/2

## (undated) DEVICE — SUT VCRL 0 L27IN ABSRB VLT L36MM CT-1 1/2

## (undated) DEVICE — GLOVE SUR 7.5 SENSICARE PI PIP CRM PWD F

## (undated) DEVICE — TIP COVER ACCESSORY

## (undated) DEVICE — 3M™ RED DOT™ MONITORING ELECTRODE WITH FOAM TAPE AND STICKY GEL, 50/BAG, 20/CASE, 72/PLT 2570: Brand: RED DOT™

## (undated) DEVICE — CURVED, LARGE JAW, OPEN SEALER/DIVIDER NANO-COATED: Brand: LIGASURE IMPACT

## (undated) DEVICE — INSUFFLATION NEEDLE TO ESTABLISH PNEUMOPERITONEUM.: Brand: INSUFFLATION NEEDLE

## (undated) DEVICE — ARM DRAPE

## (undated) DEVICE — MONOPOLAR CURVED SCISSORS: Brand: ENDOWRIST

## (undated) DEVICE — SLEEVE COMPR MD KNEE LEN SGL USE KENDALL SCD

## (undated) DEVICE — 40580 - THE PINK PAD - ADVANCED TRENDELENBURG POSITIONING KIT: Brand: 40580 - THE PINK PAD - ADVANCED TRENDELENBURG POSITIONING KIT

## (undated) DEVICE — ENDOSCOPY PACK - LOWER: Brand: MEDLINE INDUSTRIES, INC.

## (undated) DEVICE — COVER LT HNDL RIG FOR SUR CAM DISP

## (undated) DEVICE — 1200CC GUARDIAN II: Brand: GUARDIAN

## (undated) DEVICE — YANKAUER,FLEXIBLE HANDLE,REGLR CAPACITY: Brand: MEDLINE INDUSTRIES, INC.

## (undated) DEVICE — SUT MCRYL 4-0 18IN PS-2 ABSRB UD 19MM 3/8 CIR

## (undated) DEVICE — GYN LAP/ROBOTIC: Brand: MEDLINE INDUSTRIES, INC.

## (undated) NOTE — ED AVS SNAPSHOT
Poly Pena   MRN: [id number]    Department:  THE [facility] in [place]   Date of Visit:  7/5/2018           Disclosure     Insurance plans vary and the physician(s) referred by the ER may not be covered by your plan.  Please contac tell this physician (or your personal doctor if your instructions are to return to your personal doctor) about any new or lasting problems. The primary care or specialist physician will see patients referred from the [facility] Emergency Department.  Lexis Forbes

## (undated) NOTE — ED AVS SNAPSHOT
Kaylan Kannan   MRN: [id number]    Department:  [facility] in [place]   Date of Visit:  1/15/2018           Disclosure     Insurance plans vary and the physician(s) referred by the ER may not be covered by your plan.  Please conta tell this physician (or your personal doctor if your instructions are to return to your personal doctor) about any new or lasting problems. The primary care or specialist physician will see patients referred from the [facility] Emergency Department.  Leida Dobson

## (undated) NOTE — ED AVS SNAPSHOT
Jada Cost   MRN: [id number]    Department:  [facility] Department in [place]   Date of Visit:  1/7/2018           Disclosure     Insurance plans vary and the physician(s) referred by the ER may not be covered by your plan.  Please contac tell this physician (or your personal doctor if your instructions are to return to your personal doctor) about any new or lasting problems. The primary care or specialist physician will see patients referred from the [facility] Emergency Department.  Yang Tenorio

## (undated) NOTE — ED AVS SNAPSHOT
Poly Pena   MRN: [id number]    Department:  THE [facility] Emergency Department in [place]   Date of Visit:  12/8/2018           Disclosure     Insurance plans vary and the physician(s) referred by the ER may not be covered by your plan.  Please conta tell this physician (or your personal doctor if your instructions are to return to your personal doctor) about any new or lasting problems. The primary care or specialist physician will see patients referred from the [facility] Emergency Department.  Hilario Mccall

## (undated) NOTE — MR AVS SNAPSHOT
After Visit Summary   2/24/2017    Casey Lopez    MRN: [id number]           Diagnoses this Visit     Iron deficiency anemia following bariatric surgery    -  Primary       Allergies     Vicodin [Hydrocodone-Acetaminophen]     vomiting      Your

## (undated) NOTE — ED AVS SNAPSHOT
Jacquie Mill   MRN: [id number]    Department:  [facility] Emergency Department in [place]   Date of Visit:  12/7/2018           Disclosure     Insurance plans vary and the physician(s) referred by the ER may not be covered by your plan.  Please conta tell this physician (or your personal doctor if your instructions are to return to your personal doctor) about any new or lasting problems. The primary care or specialist physician will see patients referred from the [facility] Emergency Department.  Shelton Lombard

## (undated) NOTE — ED AVS SNAPSHOT
Caio Pack   MRN: [id number]    Department:  [facility] in [place]   Date of Visit:  1/9/2019           Disclosure     Insurance plans vary and the physician(s) referred by the ER may not be covered by your plan.  Please contac tell this physician (or your personal doctor if your instructions are to return to your personal doctor) about any new or lasting problems. The primary care or specialist physician will see patients referred from the [facility] Emergency Department.  Anoop Rodriguez